# Patient Record
Sex: MALE | Race: OTHER | HISPANIC OR LATINO | URBAN - METROPOLITAN AREA
[De-identification: names, ages, dates, MRNs, and addresses within clinical notes are randomized per-mention and may not be internally consistent; named-entity substitution may affect disease eponyms.]

---

## 2022-10-19 ENCOUNTER — INPATIENT (INPATIENT)
Facility: HOSPITAL | Age: 42
LOS: 2 days | Discharge: HOME | End: 2022-10-22
Attending: STUDENT IN AN ORGANIZED HEALTH CARE EDUCATION/TRAINING PROGRAM | Admitting: STUDENT IN AN ORGANIZED HEALTH CARE EDUCATION/TRAINING PROGRAM

## 2022-10-19 VITALS
OXYGEN SATURATION: 100 % | HEART RATE: 78 BPM | RESPIRATION RATE: 15 BRPM | SYSTOLIC BLOOD PRESSURE: 160 MMHG | TEMPERATURE: 98 F | DIASTOLIC BLOOD PRESSURE: 106 MMHG

## 2022-10-19 DIAGNOSIS — E11.9 TYPE 2 DIABETES MELLITUS WITHOUT COMPLICATIONS: ICD-10-CM

## 2022-10-19 DIAGNOSIS — E11.10 TYPE 2 DIABETES MELLITUS WITH KETOACIDOSIS WITHOUT COMA: ICD-10-CM

## 2022-10-19 DIAGNOSIS — Z98.52 VASECTOMY STATUS: Chronic | ICD-10-CM

## 2022-10-19 DIAGNOSIS — E78.1 PURE HYPERGLYCERIDEMIA: ICD-10-CM

## 2022-10-19 DIAGNOSIS — K85.90 ACUTE PANCREATITIS WITHOUT NECROSIS OR INFECTION, UNSPECIFIED: ICD-10-CM

## 2022-10-19 DIAGNOSIS — I10 ESSENTIAL (PRIMARY) HYPERTENSION: ICD-10-CM

## 2022-10-19 LAB
A1C WITH ESTIMATED AVERAGE GLUCOSE RESULT: 12 % — HIGH (ref 4–5.6)
ALBUMIN SERPL ELPH-MCNC: 2.7 G/DL — LOW (ref 3.3–5)
ALBUMIN SERPL ELPH-MCNC: 3.6 G/DL — SIGNIFICANT CHANGE UP (ref 3.3–5)
ALBUMIN SERPL ELPH-MCNC: 4 G/DL — SIGNIFICANT CHANGE UP (ref 3.3–5)
ALBUMIN SERPL ELPH-MCNC: 4.3 G/DL — SIGNIFICANT CHANGE UP (ref 3.3–5)
ALP SERPL-CCNC: 101 U/L — SIGNIFICANT CHANGE UP (ref 40–120)
ALP SERPL-CCNC: 60 U/L — SIGNIFICANT CHANGE UP (ref 40–120)
ALP SERPL-CCNC: 85 U/L — SIGNIFICANT CHANGE UP (ref 40–120)
ALP SERPL-CCNC: 86 U/L — SIGNIFICANT CHANGE UP (ref 40–120)
ALT FLD-CCNC: 24 U/L — SIGNIFICANT CHANGE UP (ref 4–41)
ALT FLD-CCNC: 25 U/L — SIGNIFICANT CHANGE UP (ref 4–41)
ALT FLD-CCNC: 26 U/L — SIGNIFICANT CHANGE UP (ref 4–41)
ALT FLD-CCNC: 39 U/L — SIGNIFICANT CHANGE UP (ref 4–41)
ANION GAP SERPL CALC-SCNC: 13 MMOL/L — SIGNIFICANT CHANGE UP (ref 7–14)
ANION GAP SERPL CALC-SCNC: 13 MMOL/L — SIGNIFICANT CHANGE UP (ref 7–14)
ANION GAP SERPL CALC-SCNC: 15 MMOL/L — HIGH (ref 7–14)
ANION GAP SERPL CALC-SCNC: 17 MMOL/L — HIGH (ref 7–14)
APPEARANCE UR: CLEAR — SIGNIFICANT CHANGE UP
APTT BLD: 27.5 SEC — SIGNIFICANT CHANGE UP (ref 27–36.3)
AST SERPL-CCNC: 17 U/L — SIGNIFICANT CHANGE UP (ref 4–40)
AST SERPL-CCNC: 29 U/L — SIGNIFICANT CHANGE UP (ref 4–40)
AST SERPL-CCNC: 34 U/L — SIGNIFICANT CHANGE UP (ref 4–40)
AST SERPL-CCNC: 43 U/L — HIGH (ref 4–40)
B-OH-BUTYR SERPL-SCNC: 0.4 MMOL/L — SIGNIFICANT CHANGE UP (ref 0–0.4)
B-OH-BUTYR SERPL-SCNC: 0.7 MMOL/L — HIGH (ref 0–0.4)
B-OH-BUTYR SERPL-SCNC: 3 MMOL/L — HIGH (ref 0–0.4)
B-OH-BUTYR SERPL-SCNC: 4 MMOL/L — HIGH (ref 0–0.4)
B-OH-BUTYR SERPL-SCNC: 4.4 MMOL/L — HIGH (ref 0–0.4)
BASE EXCESS BLDV CALC-SCNC: -1.7 MMOL/L — SIGNIFICANT CHANGE UP (ref -2–3)
BASE EXCESS BLDV CALC-SCNC: -2.9 MMOL/L — LOW (ref -2–3)
BASE EXCESS BLDV CALC-SCNC: -8 MMOL/L — LOW (ref -2–3)
BASOPHILS # BLD AUTO: 0.05 K/UL — SIGNIFICANT CHANGE UP (ref 0–0.2)
BASOPHILS NFR BLD AUTO: 0.3 % — SIGNIFICANT CHANGE UP (ref 0–2)
BILIRUB SERPL-MCNC: 0.3 MG/DL — SIGNIFICANT CHANGE UP (ref 0.2–1.2)
BILIRUB SERPL-MCNC: 0.5 MG/DL — SIGNIFICANT CHANGE UP (ref 0.2–1.2)
BILIRUB SERPL-MCNC: 0.5 MG/DL — SIGNIFICANT CHANGE UP (ref 0.2–1.2)
BILIRUB SERPL-MCNC: 0.6 MG/DL — SIGNIFICANT CHANGE UP (ref 0.2–1.2)
BILIRUB UR-MCNC: NEGATIVE — SIGNIFICANT CHANGE UP
BLOOD GAS VENOUS COMPREHENSIVE RESULT: SIGNIFICANT CHANGE UP
BUN SERPL-MCNC: 10 MG/DL — SIGNIFICANT CHANGE UP (ref 7–23)
BUN SERPL-MCNC: 12 MG/DL — SIGNIFICANT CHANGE UP (ref 7–23)
BUN SERPL-MCNC: 13 MG/DL — SIGNIFICANT CHANGE UP (ref 7–23)
BUN SERPL-MCNC: 2 MG/DL — LOW (ref 7–23)
BUN SERPL-MCNC: 6 MG/DL — LOW (ref 7–23)
BUN SERPL-MCNC: 6 MG/DL — LOW (ref 7–23)
CA-I BLD-SCNC: 1.07 MMOL/L — LOW (ref 1.15–1.29)
CA-I BLD-SCNC: 1.08 MMOL/L — LOW (ref 1.15–1.29)
CALCIUM SERPL-MCNC: 8 MG/DL — LOW (ref 8.4–10.5)
CALCIUM SERPL-MCNC: 8.9 MG/DL — SIGNIFICANT CHANGE UP (ref 8.4–10.5)
CALCIUM SERPL-MCNC: 8.9 MG/DL — SIGNIFICANT CHANGE UP (ref 8.4–10.5)
CALCIUM SERPL-MCNC: 9.4 MG/DL — SIGNIFICANT CHANGE UP (ref 8.4–10.5)
CALCIUM SERPL-MCNC: 9.5 MG/DL — SIGNIFICANT CHANGE UP (ref 8.4–10.5)
CALCIUM SERPL-MCNC: 9.7 MG/DL — SIGNIFICANT CHANGE UP (ref 8.4–10.5)
CHLORIDE BLDV-SCNC: 91 MMOL/L — LOW (ref 96–108)
CHLORIDE BLDV-SCNC: 99 MMOL/L — SIGNIFICANT CHANGE UP (ref 96–108)
CHLORIDE BLDV-SCNC: 99 MMOL/L — SIGNIFICANT CHANGE UP (ref 96–108)
CHLORIDE SERPL-SCNC: 101 MMOL/L — SIGNIFICANT CHANGE UP (ref 98–107)
CHLORIDE SERPL-SCNC: 88 MMOL/L — LOW (ref 98–107)
CHLORIDE SERPL-SCNC: 92 MMOL/L — LOW (ref 98–107)
CHLORIDE SERPL-SCNC: 97 MMOL/L — LOW (ref 98–107)
CHLORIDE SERPL-SCNC: 97 MMOL/L — LOW (ref 98–107)
CHLORIDE SERPL-SCNC: 98 MMOL/L — SIGNIFICANT CHANGE UP (ref 98–107)
CHOLEST SERPL-MCNC: 408 MG/DL — HIGH
CK SERPL-CCNC: 76 U/L — SIGNIFICANT CHANGE UP (ref 30–200)
CO2 BLDV-SCNC: 17.7 MMOL/L — LOW (ref 22–26)
CO2 BLDV-SCNC: 23.2 MMOL/L — SIGNIFICANT CHANGE UP (ref 22–26)
CO2 BLDV-SCNC: 24.2 MMOL/L — SIGNIFICANT CHANGE UP (ref 22–26)
CO2 SERPL-SCNC: 15 MMOL/L — LOW (ref 22–31)
CO2 SERPL-SCNC: 16 MMOL/L — LOW (ref 22–31)
CO2 SERPL-SCNC: 17 MMOL/L — LOW (ref 22–31)
CO2 SERPL-SCNC: 19 MMOL/L — LOW (ref 22–31)
CO2 SERPL-SCNC: 19 MMOL/L — LOW (ref 22–31)
CO2 SERPL-SCNC: 21 MMOL/L — LOW (ref 22–31)
COLOR SPEC: SIGNIFICANT CHANGE UP
CREAT SERPL-MCNC: 0.37 MG/DL — LOW (ref 0.5–1.3)
CREAT SERPL-MCNC: 0.5 MG/DL — SIGNIFICANT CHANGE UP (ref 0.5–1.3)
CREAT SERPL-MCNC: 0.52 MG/DL — SIGNIFICANT CHANGE UP (ref 0.5–1.3)
CREAT SERPL-MCNC: 0.54 MG/DL — SIGNIFICANT CHANGE UP (ref 0.5–1.3)
CREAT SERPL-MCNC: 0.61 MG/DL — SIGNIFICANT CHANGE UP (ref 0.5–1.3)
CREAT SERPL-MCNC: 0.65 MG/DL — SIGNIFICANT CHANGE UP (ref 0.5–1.3)
DIFF PNL FLD: NEGATIVE — SIGNIFICANT CHANGE UP
EGFR: 122 ML/MIN/1.73M2 — SIGNIFICANT CHANGE UP
EGFR: 125 ML/MIN/1.73M2 — SIGNIFICANT CHANGE UP
EGFR: 129 ML/MIN/1.73M2 — SIGNIFICANT CHANGE UP
EGFR: 131 ML/MIN/1.73M2 — SIGNIFICANT CHANGE UP
EGFR: 132 ML/MIN/1.73M2 — SIGNIFICANT CHANGE UP
EGFR: 145 ML/MIN/1.73M2 — SIGNIFICANT CHANGE UP
EOSINOPHIL # BLD AUTO: 0.02 K/UL — SIGNIFICANT CHANGE UP (ref 0–0.5)
EOSINOPHIL NFR BLD AUTO: 0.1 % — SIGNIFICANT CHANGE UP (ref 0–6)
ESTIMATED AVERAGE GLUCOSE: 298 — SIGNIFICANT CHANGE UP
GAS PNL BLDV: 122 MMOL/L — LOW (ref 136–145)
GAS PNL BLDV: 130 MMOL/L — LOW (ref 136–145)
GAS PNL BLDV: 131 MMOL/L — LOW (ref 136–145)
GAS PNL BLDV: SIGNIFICANT CHANGE UP
GLUCOSE BLDC GLUCOMTR-MCNC: 106 MG/DL — HIGH (ref 70–99)
GLUCOSE BLDC GLUCOMTR-MCNC: 120 MG/DL — HIGH (ref 70–99)
GLUCOSE BLDC GLUCOMTR-MCNC: 125 MG/DL — HIGH (ref 70–99)
GLUCOSE BLDC GLUCOMTR-MCNC: 128 MG/DL — HIGH (ref 70–99)
GLUCOSE BLDC GLUCOMTR-MCNC: 146 MG/DL — HIGH (ref 70–99)
GLUCOSE BLDC GLUCOMTR-MCNC: 153 MG/DL — HIGH (ref 70–99)
GLUCOSE BLDC GLUCOMTR-MCNC: 155 MG/DL — HIGH (ref 70–99)
GLUCOSE BLDC GLUCOMTR-MCNC: 156 MG/DL — HIGH (ref 70–99)
GLUCOSE BLDC GLUCOMTR-MCNC: 180 MG/DL — HIGH (ref 70–99)
GLUCOSE BLDC GLUCOMTR-MCNC: 191 MG/DL — HIGH (ref 70–99)
GLUCOSE BLDC GLUCOMTR-MCNC: 233 MG/DL — HIGH (ref 70–99)
GLUCOSE BLDV-MCNC: 121 MG/DL — HIGH (ref 70–99)
GLUCOSE BLDV-MCNC: 163 MG/DL — HIGH (ref 70–99)
GLUCOSE BLDV-MCNC: >685 MG/DL — CRITICAL HIGH (ref 70–99)
GLUCOSE SERPL-MCNC: 118 MG/DL — HIGH (ref 70–99)
GLUCOSE SERPL-MCNC: 145 MG/DL — HIGH (ref 70–99)
GLUCOSE SERPL-MCNC: 214 MG/DL — HIGH (ref 70–99)
GLUCOSE SERPL-MCNC: 293 MG/DL — HIGH (ref 70–99)
GLUCOSE SERPL-MCNC: 334 MG/DL — HIGH (ref 70–99)
GLUCOSE SERPL-MCNC: 899 MG/DL — CRITICAL HIGH (ref 70–99)
GLUCOSE UR QL: ABNORMAL
HCO3 BLDV-SCNC: 17 MMOL/L — LOW (ref 22–29)
HCO3 BLDV-SCNC: 22 MMOL/L — SIGNIFICANT CHANGE UP (ref 22–29)
HCO3 BLDV-SCNC: 23 MMOL/L — SIGNIFICANT CHANGE UP (ref 22–29)
HCT VFR BLD CALC: 43.3 % — SIGNIFICANT CHANGE UP (ref 39–50)
HCT VFR BLDA CALC: 35 % — LOW (ref 39–51)
HCT VFR BLDA CALC: 44 % — SIGNIFICANT CHANGE UP (ref 39–51)
HCT VFR BLDA CALC: 45 % — SIGNIFICANT CHANGE UP (ref 39–51)
HDLC SERPL-MCNC: 18 MG/DL — LOW
HGB BLD CALC-MCNC: 11.8 G/DL — LOW (ref 13–17)
HGB BLD CALC-MCNC: 14.6 G/DL — SIGNIFICANT CHANGE UP (ref 13–17)
HGB BLD CALC-MCNC: 15 G/DL — SIGNIFICANT CHANGE UP (ref 13–17)
HGB BLD-MCNC: 14.5 G/DL — SIGNIFICANT CHANGE UP (ref 13–17)
IANC: 13.27 K/UL — HIGH (ref 1.8–7.4)
IMM GRANULOCYTES NFR BLD AUTO: 0.3 % — SIGNIFICANT CHANGE UP (ref 0–0.9)
INR BLD: 1.12 RATIO — SIGNIFICANT CHANGE UP (ref 0.88–1.16)
KETONES UR-MCNC: ABNORMAL
LACTATE BLDV-MCNC: 1 MMOL/L — SIGNIFICANT CHANGE UP (ref 0.5–2)
LACTATE BLDV-MCNC: 1.2 MMOL/L — SIGNIFICANT CHANGE UP (ref 0.5–2)
LACTATE BLDV-MCNC: 2.1 MMOL/L — HIGH (ref 0.5–2)
LACTATE BLDV-MCNC: 4.9 MMOL/L — CRITICAL HIGH (ref 0.5–2)
LEUKOCYTE ESTERASE UR-ACNC: NEGATIVE — SIGNIFICANT CHANGE UP
LIDOCAIN IGE QN: 1410 U/L — HIGH (ref 7–60)
LIPID PNL WITH DIRECT LDL SERPL: SIGNIFICANT CHANGE UP MG/DL
LYMPHOCYTES # BLD AUTO: 1.62 K/UL — SIGNIFICANT CHANGE UP (ref 1–3.3)
LYMPHOCYTES # BLD AUTO: 10.3 % — LOW (ref 13–44)
MAGNESIUM SERPL-MCNC: 1.2 MG/DL — LOW (ref 1.6–2.6)
MAGNESIUM SERPL-MCNC: 1.6 MG/DL — SIGNIFICANT CHANGE UP (ref 1.6–2.6)
MAGNESIUM SERPL-MCNC: 2.3 MG/DL — SIGNIFICANT CHANGE UP (ref 1.6–2.6)
MCHC RBC-ENTMCNC: 28.2 PG — SIGNIFICANT CHANGE UP (ref 27–34)
MCHC RBC-ENTMCNC: 33.4 GM/DL — SIGNIFICANT CHANGE UP (ref 32–36)
MCV RBC AUTO: 84.2 FL — SIGNIFICANT CHANGE UP (ref 80–100)
MONOCYTES # BLD AUTO: 0.66 K/UL — SIGNIFICANT CHANGE UP (ref 0–0.9)
MONOCYTES NFR BLD AUTO: 4.2 % — SIGNIFICANT CHANGE UP (ref 2–14)
NEUTROPHILS # BLD AUTO: 13.27 K/UL — HIGH (ref 1.8–7.4)
NEUTROPHILS NFR BLD AUTO: 84.8 % — HIGH (ref 43–77)
NITRITE UR-MCNC: NEGATIVE — SIGNIFICANT CHANGE UP
NON HDL CHOLESTEROL: 390 MG/DL — HIGH
NRBC # BLD: 0 /100 WBCS — SIGNIFICANT CHANGE UP (ref 0–0)
NRBC # FLD: 0 K/UL — SIGNIFICANT CHANGE UP (ref 0–0)
PCO2 BLDV: 31 MMHG — LOW (ref 42–55)
PCO2 BLDV: 38 MMHG — LOW (ref 42–55)
PCO2 BLDV: 38 MMHG — LOW (ref 42–55)
PH BLDV: 7.34 — SIGNIFICANT CHANGE UP (ref 7.32–7.43)
PH BLDV: 7.37 — SIGNIFICANT CHANGE UP (ref 7.32–7.43)
PH BLDV: 7.39 — SIGNIFICANT CHANGE UP (ref 7.32–7.43)
PH UR: 5.5 — SIGNIFICANT CHANGE UP (ref 5–8)
PHOSPHATE SERPL-MCNC: 1.9 MG/DL — LOW (ref 2.5–4.5)
PHOSPHATE SERPL-MCNC: 2.9 MG/DL — SIGNIFICANT CHANGE UP (ref 2.5–4.5)
PHOSPHATE SERPL-MCNC: 3.7 MG/DL — SIGNIFICANT CHANGE UP (ref 2.5–4.5)
PLATELET # BLD AUTO: 251 K/UL — SIGNIFICANT CHANGE UP (ref 150–400)
PO2 BLDV: 44 MMHG — SIGNIFICANT CHANGE UP
PO2 BLDV: 47 MMHG — SIGNIFICANT CHANGE UP
PO2 BLDV: 57 MMHG — SIGNIFICANT CHANGE UP
POTASSIUM BLDV-SCNC: 3.4 MMOL/L — LOW (ref 3.5–5.1)
POTASSIUM BLDV-SCNC: 3.5 MMOL/L — SIGNIFICANT CHANGE UP (ref 3.5–5.1)
POTASSIUM BLDV-SCNC: 3.6 MMOL/L — SIGNIFICANT CHANGE UP (ref 3.5–5.1)
POTASSIUM SERPL-MCNC: 3.8 MMOL/L — SIGNIFICANT CHANGE UP (ref 3.5–5.3)
POTASSIUM SERPL-MCNC: 3.8 MMOL/L — SIGNIFICANT CHANGE UP (ref 3.5–5.3)
POTASSIUM SERPL-MCNC: 4 MMOL/L — SIGNIFICANT CHANGE UP (ref 3.5–5.3)
POTASSIUM SERPL-MCNC: 5.2 MMOL/L — SIGNIFICANT CHANGE UP (ref 3.5–5.3)
POTASSIUM SERPL-MCNC: SIGNIFICANT CHANGE UP MMOL/L (ref 3.5–5.3)
POTASSIUM SERPL-MCNC: SIGNIFICANT CHANGE UP MMOL/L (ref 3.5–5.3)
POTASSIUM SERPL-SCNC: 3.8 MMOL/L — SIGNIFICANT CHANGE UP (ref 3.5–5.3)
POTASSIUM SERPL-SCNC: 3.8 MMOL/L — SIGNIFICANT CHANGE UP (ref 3.5–5.3)
POTASSIUM SERPL-SCNC: 4 MMOL/L — SIGNIFICANT CHANGE UP (ref 3.5–5.3)
POTASSIUM SERPL-SCNC: 5.2 MMOL/L — SIGNIFICANT CHANGE UP (ref 3.5–5.3)
POTASSIUM SERPL-SCNC: SIGNIFICANT CHANGE UP MMOL/L (ref 3.5–5.3)
POTASSIUM SERPL-SCNC: SIGNIFICANT CHANGE UP MMOL/L (ref 3.5–5.3)
PROT SERPL-MCNC: 5.1 G/DL — LOW (ref 6–8.3)
PROT SERPL-MCNC: 6.5 G/DL — SIGNIFICANT CHANGE UP (ref 6–8.3)
PROT SERPL-MCNC: 7 G/DL — SIGNIFICANT CHANGE UP (ref 6–8.3)
PROT SERPL-MCNC: 7 G/DL — SIGNIFICANT CHANGE UP (ref 6–8.3)
PROT UR-MCNC: ABNORMAL
PROTHROM AB SERPL-ACNC: 13 SEC — SIGNIFICANT CHANGE UP (ref 10.5–13.4)
RBC # BLD: 5.14 M/UL — SIGNIFICANT CHANGE UP (ref 4.2–5.8)
RBC # FLD: 12.8 % — SIGNIFICANT CHANGE UP (ref 10.3–14.5)
SAO2 % BLDV: 77.5 % — SIGNIFICANT CHANGE UP
SAO2 % BLDV: 79.8 % — SIGNIFICANT CHANGE UP
SAO2 % BLDV: 89.1 % — SIGNIFICANT CHANGE UP
SARS-COV-2 RNA SPEC QL NAA+PROBE: SIGNIFICANT CHANGE UP
SODIUM SERPL-SCNC: 118 MMOL/L — CRITICAL LOW (ref 135–145)
SODIUM SERPL-SCNC: 128 MMOL/L — LOW (ref 135–145)
SODIUM SERPL-SCNC: 130 MMOL/L — LOW (ref 135–145)
SODIUM SERPL-SCNC: 131 MMOL/L — LOW (ref 135–145)
SODIUM SERPL-SCNC: 131 MMOL/L — LOW (ref 135–145)
SODIUM SERPL-SCNC: 134 MMOL/L — LOW (ref 135–145)
SP GR SPEC: 1.04 — SIGNIFICANT CHANGE UP (ref 1.01–1.05)
TRIGL SERPL-MCNC: 2025 MG/DL — HIGH
TRIGL SERPL-MCNC: 812 MG/DL — HIGH
UROBILINOGEN FLD QL: SIGNIFICANT CHANGE UP
WBC # BLD: 15.67 K/UL — HIGH (ref 3.8–10.5)
WBC # FLD AUTO: 15.67 K/UL — HIGH (ref 3.8–10.5)

## 2022-10-19 PROCEDURE — 99285 EMERGENCY DEPT VISIT HI MDM: CPT

## 2022-10-19 PROCEDURE — 99223 1ST HOSP IP/OBS HIGH 75: CPT

## 2022-10-19 PROCEDURE — 99291 CRITICAL CARE FIRST HOUR: CPT | Mod: GC

## 2022-10-19 PROCEDURE — 71046 X-RAY EXAM CHEST 2 VIEWS: CPT | Mod: 26

## 2022-10-19 PROCEDURE — 74177 CT ABD & PELVIS W/CONTRAST: CPT | Mod: 26

## 2022-10-19 RX ORDER — INSULIN HUMAN 100 [IU]/ML
6 INJECTION, SOLUTION SUBCUTANEOUS
Qty: 100 | Refills: 0 | Status: DISCONTINUED | OUTPATIENT
Start: 2022-10-19 | End: 2022-10-20

## 2022-10-19 RX ORDER — FENOFIBRATE,MICRONIZED 130 MG
145 CAPSULE ORAL DAILY
Refills: 0 | Status: DISCONTINUED | OUTPATIENT
Start: 2022-10-19 | End: 2022-10-22

## 2022-10-19 RX ORDER — GLUCAGON INJECTION, SOLUTION 0.5 MG/.1ML
1 INJECTION, SOLUTION SUBCUTANEOUS ONCE
Refills: 0 | Status: DISCONTINUED | OUTPATIENT
Start: 2022-10-19 | End: 2022-10-22

## 2022-10-19 RX ORDER — SODIUM CHLORIDE 9 MG/ML
1000 INJECTION, SOLUTION INTRAVENOUS
Refills: 0 | Status: DISCONTINUED | OUTPATIENT
Start: 2022-10-19 | End: 2022-10-22

## 2022-10-19 RX ORDER — ACETAMINOPHEN 500 MG
650 TABLET ORAL EVERY 6 HOURS
Refills: 0 | Status: DISCONTINUED | OUTPATIENT
Start: 2022-10-19 | End: 2022-10-22

## 2022-10-19 RX ORDER — ATORVASTATIN CALCIUM 80 MG/1
40 TABLET, FILM COATED ORAL ONCE
Refills: 0 | Status: COMPLETED | OUTPATIENT
Start: 2022-10-19 | End: 2022-10-19

## 2022-10-19 RX ORDER — MORPHINE SULFATE 50 MG/1
2 CAPSULE, EXTENDED RELEASE ORAL EVERY 4 HOURS
Refills: 0 | Status: DISCONTINUED | OUTPATIENT
Start: 2022-10-19 | End: 2022-10-22

## 2022-10-19 RX ORDER — MORPHINE SULFATE 50 MG/1
4 CAPSULE, EXTENDED RELEASE ORAL ONCE
Refills: 0 | Status: DISCONTINUED | OUTPATIENT
Start: 2022-10-19 | End: 2022-10-19

## 2022-10-19 RX ORDER — INSULIN HUMAN 100 [IU]/ML
5 INJECTION, SOLUTION SUBCUTANEOUS
Qty: 100 | Refills: 0 | Status: DISCONTINUED | OUTPATIENT
Start: 2022-10-19 | End: 2022-10-19

## 2022-10-19 RX ORDER — LISINOPRIL 2.5 MG/1
10 TABLET ORAL DAILY
Refills: 0 | Status: DISCONTINUED | OUTPATIENT
Start: 2022-10-19 | End: 2022-10-22

## 2022-10-19 RX ORDER — DEXTROSE 50 % IN WATER 50 %
25 SYRINGE (ML) INTRAVENOUS ONCE
Refills: 0 | Status: DISCONTINUED | OUTPATIENT
Start: 2022-10-19 | End: 2022-10-22

## 2022-10-19 RX ORDER — ATORVASTATIN CALCIUM 80 MG/1
40 TABLET, FILM COATED ORAL AT BEDTIME
Refills: 0 | Status: DISCONTINUED | OUTPATIENT
Start: 2022-10-20 | End: 2022-10-22

## 2022-10-19 RX ORDER — SODIUM CHLORIDE 9 MG/ML
2000 INJECTION INTRAMUSCULAR; INTRAVENOUS; SUBCUTANEOUS ONCE
Refills: 0 | Status: COMPLETED | OUTPATIENT
Start: 2022-10-19 | End: 2022-10-19

## 2022-10-19 RX ORDER — SODIUM CHLORIDE 9 MG/ML
1000 INJECTION, SOLUTION INTRAVENOUS
Refills: 0 | Status: DISCONTINUED | OUTPATIENT
Start: 2022-10-19 | End: 2022-10-19

## 2022-10-19 RX ORDER — CHLORHEXIDINE GLUCONATE 213 G/1000ML
1 SOLUTION TOPICAL
Refills: 0 | Status: DISCONTINUED | OUTPATIENT
Start: 2022-10-19 | End: 2022-10-22

## 2022-10-19 RX ORDER — OMEGA-3 ACID ETHYL ESTERS 1 G
4 CAPSULE ORAL DAILY
Refills: 0 | Status: DISCONTINUED | OUTPATIENT
Start: 2022-10-19 | End: 2022-10-22

## 2022-10-19 RX ORDER — ONDANSETRON 8 MG/1
4 TABLET, FILM COATED ORAL ONCE
Refills: 0 | Status: COMPLETED | OUTPATIENT
Start: 2022-10-19 | End: 2022-10-19

## 2022-10-19 RX ORDER — POTASSIUM CHLORIDE 20 MEQ
10 PACKET (EA) ORAL
Refills: 0 | Status: COMPLETED | OUTPATIENT
Start: 2022-10-19 | End: 2022-10-19

## 2022-10-19 RX ORDER — MORPHINE SULFATE 50 MG/1
4 CAPSULE, EXTENDED RELEASE ORAL EVERY 6 HOURS
Refills: 0 | Status: DISCONTINUED | OUTPATIENT
Start: 2022-10-19 | End: 2022-10-19

## 2022-10-19 RX ORDER — MORPHINE SULFATE 50 MG/1
2 CAPSULE, EXTENDED RELEASE ORAL EVERY 4 HOURS
Refills: 0 | Status: DISCONTINUED | OUTPATIENT
Start: 2022-10-19 | End: 2022-10-19

## 2022-10-19 RX ORDER — INSULIN GLARGINE 100 [IU]/ML
14 INJECTION, SOLUTION SUBCUTANEOUS ONCE
Refills: 0 | Status: DISCONTINUED | OUTPATIENT
Start: 2022-10-19 | End: 2022-10-19

## 2022-10-19 RX ORDER — SODIUM CHLORIDE 9 MG/ML
1000 INJECTION, SOLUTION INTRAVENOUS
Refills: 0 | Status: DISCONTINUED | OUTPATIENT
Start: 2022-10-19 | End: 2022-10-20

## 2022-10-19 RX ORDER — DEXTROSE 50 % IN WATER 50 %
12.5 SYRINGE (ML) INTRAVENOUS ONCE
Refills: 0 | Status: DISCONTINUED | OUTPATIENT
Start: 2022-10-19 | End: 2022-10-22

## 2022-10-19 RX ORDER — POTASSIUM PHOSPHATE, MONOBASIC POTASSIUM PHOSPHATE, DIBASIC 236; 224 MG/ML; MG/ML
15 INJECTION, SOLUTION INTRAVENOUS ONCE
Refills: 0 | Status: COMPLETED | OUTPATIENT
Start: 2022-10-19 | End: 2022-10-19

## 2022-10-19 RX ORDER — MORPHINE SULFATE 50 MG/1
2 CAPSULE, EXTENDED RELEASE ORAL EVERY 6 HOURS
Refills: 0 | Status: DISCONTINUED | OUTPATIENT
Start: 2022-10-19 | End: 2022-10-19

## 2022-10-19 RX ORDER — SODIUM CHLORIDE 9 MG/ML
1000 INJECTION INTRAMUSCULAR; INTRAVENOUS; SUBCUTANEOUS ONCE
Refills: 0 | Status: COMPLETED | OUTPATIENT
Start: 2022-10-19 | End: 2022-10-19

## 2022-10-19 RX ORDER — ENOXAPARIN SODIUM 100 MG/ML
40 INJECTION SUBCUTANEOUS EVERY 24 HOURS
Refills: 0 | Status: DISCONTINUED | OUTPATIENT
Start: 2022-10-19 | End: 2022-10-22

## 2022-10-19 RX ORDER — DEXTROSE 50 % IN WATER 50 %
15 SYRINGE (ML) INTRAVENOUS ONCE
Refills: 0 | Status: DISCONTINUED | OUTPATIENT
Start: 2022-10-19 | End: 2022-10-22

## 2022-10-19 RX ORDER — BNT162B2 ORIGINAL AND OMICRON BA.4/BA.5 .1125; .1125 MG/2.25ML; MG/2.25ML
0.3 INJECTION, SUSPENSION INTRAMUSCULAR ONCE
Refills: 0 | Status: DISCONTINUED | OUTPATIENT
Start: 2022-10-19 | End: 2022-10-22

## 2022-10-19 RX ORDER — INSULIN LISPRO 100/ML
5 VIAL (ML) SUBCUTANEOUS ONCE
Refills: 0 | Status: COMPLETED | OUTPATIENT
Start: 2022-10-19 | End: 2022-10-19

## 2022-10-19 RX ORDER — INFLUENZA VIRUS VACCINE 15; 15; 15; 15 UG/.5ML; UG/.5ML; UG/.5ML; UG/.5ML
0.5 SUSPENSION INTRAMUSCULAR ONCE
Refills: 0 | Status: DISCONTINUED | OUTPATIENT
Start: 2022-10-19 | End: 2022-10-22

## 2022-10-19 RX ORDER — CALCIUM GLUCONATE 100 MG/ML
2 VIAL (ML) INTRAVENOUS ONCE
Refills: 0 | Status: COMPLETED | OUTPATIENT
Start: 2022-10-19 | End: 2022-10-19

## 2022-10-19 RX ORDER — MAGNESIUM SULFATE 500 MG/ML
2 VIAL (ML) INJECTION ONCE
Refills: 0 | Status: COMPLETED | OUTPATIENT
Start: 2022-10-19 | End: 2022-10-19

## 2022-10-19 RX ORDER — INSULIN LISPRO 100/ML
VIAL (ML) SUBCUTANEOUS EVERY 4 HOURS
Refills: 0 | Status: DISCONTINUED | OUTPATIENT
Start: 2022-10-19 | End: 2022-10-19

## 2022-10-19 RX ORDER — ACETAMINOPHEN 500 MG
1000 TABLET ORAL ONCE
Refills: 0 | Status: COMPLETED | OUTPATIENT
Start: 2022-10-19 | End: 2022-10-19

## 2022-10-19 RX ADMIN — MORPHINE SULFATE 2 MILLIGRAM(S): 50 CAPSULE, EXTENDED RELEASE ORAL at 15:55

## 2022-10-19 RX ADMIN — SODIUM CHLORIDE 100 MILLILITER(S): 9 INJECTION, SOLUTION INTRAVENOUS at 13:10

## 2022-10-19 RX ADMIN — Medication 100 MILLIEQUIVALENT(S): at 22:14

## 2022-10-19 RX ADMIN — Medication 1000 MILLIGRAM(S): at 20:00

## 2022-10-19 RX ADMIN — ATORVASTATIN CALCIUM 40 MILLIGRAM(S): 80 TABLET, FILM COATED ORAL at 14:57

## 2022-10-19 RX ADMIN — Medication 100 GRAM(S): at 14:55

## 2022-10-19 RX ADMIN — Medication 400 MILLIGRAM(S): at 19:47

## 2022-10-19 RX ADMIN — LISINOPRIL 10 MILLIGRAM(S): 2.5 TABLET ORAL at 14:55

## 2022-10-19 RX ADMIN — SODIUM CHLORIDE 40 MILLILITER(S): 9 INJECTION, SOLUTION INTRAVENOUS at 17:21

## 2022-10-19 RX ADMIN — INSULIN HUMAN 5 UNIT(S)/HR: 100 INJECTION, SOLUTION SUBCUTANEOUS at 19:19

## 2022-10-19 RX ADMIN — Medication 25 GRAM(S): at 21:00

## 2022-10-19 RX ADMIN — Medication 5 UNIT(S): at 10:14

## 2022-10-19 RX ADMIN — SODIUM CHLORIDE 2000 MILLILITER(S): 9 INJECTION INTRAMUSCULAR; INTRAVENOUS; SUBCUTANEOUS at 08:42

## 2022-10-19 RX ADMIN — Medication 100 MILLIEQUIVALENT(S): at 21:40

## 2022-10-19 RX ADMIN — INSULIN HUMAN 6 UNIT(S)/HR: 100 INJECTION, SOLUTION SUBCUTANEOUS at 23:31

## 2022-10-19 RX ADMIN — SODIUM CHLORIDE 40 MILLILITER(S): 9 INJECTION, SOLUTION INTRAVENOUS at 19:20

## 2022-10-19 RX ADMIN — SODIUM CHLORIDE 75 MILLILITER(S): 9 INJECTION, SOLUTION INTRAVENOUS at 19:19

## 2022-10-19 RX ADMIN — SODIUM CHLORIDE 1000 MILLILITER(S): 9 INJECTION INTRAMUSCULAR; INTRAVENOUS; SUBCUTANEOUS at 11:34

## 2022-10-19 RX ADMIN — MORPHINE SULFATE 4 MILLIGRAM(S): 50 CAPSULE, EXTENDED RELEASE ORAL at 11:19

## 2022-10-19 RX ADMIN — INSULIN HUMAN 5 UNIT(S)/HR: 100 INJECTION, SOLUTION SUBCUTANEOUS at 13:09

## 2022-10-19 RX ADMIN — POTASSIUM PHOSPHATE, MONOBASIC POTASSIUM PHOSPHATE, DIBASIC 62.5 MILLIMOLE(S): 236; 224 INJECTION, SOLUTION INTRAVENOUS at 22:56

## 2022-10-19 RX ADMIN — ENOXAPARIN SODIUM 40 MILLIGRAM(S): 100 INJECTION SUBCUTANEOUS at 17:19

## 2022-10-19 RX ADMIN — Medication 4 GRAM(S): at 14:56

## 2022-10-19 RX ADMIN — SODIUM CHLORIDE 2000 MILLILITER(S): 9 INJECTION INTRAMUSCULAR; INTRAVENOUS; SUBCUTANEOUS at 10:42

## 2022-10-19 RX ADMIN — Medication 145 MILLIGRAM(S): at 14:55

## 2022-10-19 RX ADMIN — SODIUM CHLORIDE 75 MILLILITER(S): 9 INJECTION, SOLUTION INTRAVENOUS at 17:20

## 2022-10-19 RX ADMIN — MORPHINE SULFATE 4 MILLIGRAM(S): 50 CAPSULE, EXTENDED RELEASE ORAL at 10:19

## 2022-10-19 RX ADMIN — ONDANSETRON 4 MILLIGRAM(S): 8 TABLET, FILM COATED ORAL at 08:42

## 2022-10-19 RX ADMIN — CHLORHEXIDINE GLUCONATE 1 APPLICATION(S): 213 SOLUTION TOPICAL at 14:56

## 2022-10-19 RX ADMIN — Medication 100 MILLIEQUIVALENT(S): at 23:00

## 2022-10-19 RX ADMIN — MORPHINE SULFATE 2 MILLIGRAM(S): 50 CAPSULE, EXTENDED RELEASE ORAL at 15:40

## 2022-10-19 NOTE — ED PROVIDER NOTE - CRITICAL CARE ATTENDING CONTRIBUTION TO CARE
I have evaluated the patient and agree with the documentation and assessment as made by the PA. We have discussed plan of care and work up for the patient.  I provided all elements of critical care.     Brief HPI:  42-year-old male past medical history of hypertension (diet-controlled) here complaining of epigastric abdominal pain since last night associated with vomiting.  Denies fever, chills, diarrhea, bloody or dark stool.  Denies etoh or illicit drug use history.     Vitals:   Reviewed    Exam:    GEN:  Non-toxic appearing, non-distressed, speaking full sentences, non-diaphoretic, AAOx3  HEENT:  NCAT, neck supple, EOMI, PERRLA, sclera anicteric, no conjunctival pallor or injection, no stridor, normal voice, no tonsillar exudate, uvula midline  CV:  regular rhythm and rate, s1/s2 audible, no murmurs, rubs or gallops, peripheral pulses 2+ and symmetric  PULM:  non-labored respirations, lungs clear to auscultation bilaterally, no wheezes, crackles or rales  ABD:  non distended, non-tender, no rebound, no guarding, negative Parikh's sign, bowel sounds normal, no cvat  MSK:  no gross deformity, non-tender extremities and joints, range of motion grossly normal appearing, no extremity edema, extremities warm and well perfused   NEURO:  AAOx3, CN II-XII intact, motor 5/5 in upper and lower extremities bilaterally, sensation grossly intact in extremities and trunk, finger to nose testing wnl, no nystagmus, negative Romberg, no pronator drift, no gait deficit  SKIN:  warm, dry, no rash or vesicles     A/P:  42-year-old male past medical history of hypertension (diet-controlled) here complaining of epigastric abdominal pain since last night.  VSS.  Abdomen non-tender.  Elevated fsg, no history of diabetes. Possible dka.  Will send labs, supportive care.  Dispo pending.

## 2022-10-19 NOTE — CONSULT NOTE ADULT - ASSESSMENT
42M with HTN presenting with abdominal pain, found to have HTG pancreatitis and DKA    #New onset DM (T1DM vs. KPD) with DKA with Hypertriglyceridemia induced pancreatitis   A1c 12%  pH 7.28, BHB 4.4, CO2 19, AG 17.   TG 2339  DKA and uncontrolled DM are both potential triggers for hyperTG induced pancreatitis. Improvement in DKA will improve TG levels     Recs:   -Patient without worrisome features of pancreatitis at this time, so continue NPO status, insulin gtt, and supportive measures with fluids.  -Trend TG q12 hrs and check BMP, BHB, VBG q4 hrs for now  -If FS < 200 while on insulin gtt, can start dexrose at lower rate (preferably 25-50 cc/hr to prevent worsening hypertriglyceridemia). Can consider using different IV line for these fluids so LR can run at higher rate on another line  -Insulin gtt should be continued until both DKA resolves (AG < 14, Bicarb > 18 on 2 repeat checks ideally) and TG are < 500 in setting of pancreatitis  -If DKA resolves, but TG are still >500, insulin gtt will need to be continued. In that case, the insulin gtt should continue at a lower rate (1-2 units/hr) than required for DKA to avoid high dextrose requirements which can worsen TG level and prolong ICU stay.   -If TG levels do not improve/worsen and/or patient has worrisome features concerning for organ dysfunction (hypocalcemia, fevers, leukocytosis, HD instability) will need to consider plasmapheresis as a possible treatment option  -Check Ab to r/o T1DM/RYAN - Glutamic Acid Decarboxylase, Zinc Transporter 8, Islet Cell Ab, and IA-2 Ab  -Would recommend initiation of Atorvastatin 40 mg daily when tolerating PO    For Discharge:   -Basal/bolus - final doses TBD  -Please d/c with supplies: insulin pens, pen needles, glucometer, test strips, lancets, and alcohol pads   -Endocrine Practice at [address] [place] [address], [address], [place] [address]; Ph # [phone]    #HLD  Start Atorvastatin 40 mg daily as above    #HTN  BP Goal < 130/80  Continue management per primary team    Natalia Chaney MD  Endocrine Fellow  Can be reached via teams.     For all urgent matters, can call answering service at [phone] (weekdays); [phone] (nights/weekends)  Any non-urgent consults or questions can be emailed to [email or web address] or [email or web address]     42M with HTN presenting with abdominal pain, found to have HTG pancreatitis and DKA    #New onset DM (T1DM vs. KPD) with DKA with Hypertriglyceridemia induced pancreatitis   A1c 12%  pH 7.28, BHB 4.4, CO2 19, AG 17.   TG 2339  DKA and uncontrolled DM are both potential triggers for hyperTG induced pancreatitis. Improvement in DKA will improve TG levels     Recs:   -Patient without worrisome features of pancreatitis at this time, so continue NPO status, insulin gtt, and supportive measures with fluids.  -Insulin gtt per ICU protocol. If FS < 200 while on insulin gtt, can start dexrose at lower rate (preferably 25-50 cc/hr to prevent worsening hypertriglyceridemia). Can consider using different IV line for these fluids so LR can run at higher rate on another line  -Trend TG q12 hrs and check BMP, BHB, VBG q4 hrs for now  -Insulin gtt should be continued until both DKA resolves (AG < 14, Bicarb > 18 on 2 repeat checks ideally) and TG are < 500 in setting of pancreatitis  -If DKA resolves, but TG are still >500, insulin gtt will need to be continued. In that case, the insulin gtt should continue at a lower rate (1-2 units/hr) than required for DKA to avoid high dextrose requirements which can worsen TG level and prolong ICU stay.   -If TG levels do not improve/worsen and/or patient has worrisome features concerning for organ dysfunction (hypocalcemia, fevers, leukocytosis, HD instability) will need to consider plasmapheresis as a possible treatment option  -Check Ab to r/o T1DM/RYAN - Glutamic Acid Decarboxylase, Zinc Transporter 8, Islet Cell Ab, and IA-2 Ab  -Would recommend initiation of Atorvastatin 40 mg daily when tolerating PO    For Discharge:   -Basal/bolus - final doses TBD  -Please d/c with supplies: insulin pens, pen needles, glucometer, test strips, lancets, and alcohol pads   -Endocrine Practice at [address] [place] [address], [address], [place] [address]; Ph # [phone]    #HLD  Start Atorvastatin 40 mg daily as above    #HTN  BP Goal < 130/80  Continue management per primary team    Natalia Chaney MD  Endocrine Fellow  Can be reached via teams.     For all urgent matters, can call answering service at [phone] (weekdays); [phone] (nights/weekends)  Any non-urgent consults or questions can be emailed to [email or web address] or [email or web address]     42M with HTN presenting with abdominal pain, found to have HTG pancreatitis and DKA    #New onset DM (T1DM vs. KPD) with DKA with Hypertriglyceridemia induced pancreatitis   A1c 12%  pH 7.28, BHB 4.4, CO2 19, AG 17.   TG 2339  DKA and uncontrolled DM are both potential triggers for hyperTG induced pancreatitis. Improvement in DKA will improve TG levels     Recs:   -Patient without worrisome features of pancreatitis at this time, so continue NPO status, insulin gtt, and supportive measures with fluids.  -Insulin gtt per ICU protocol. If FS < 200 while on insulin gtt, can start dexrose at lower rate (preferably 25-50 cc/hr to prevent worsening hypertriglyceridemia). Can consider using different IV line for these fluids so LR can run at higher rate on another line  -Thr prior is correcting the DKA with insulin gtt according to ICU DKA protocol. DKA resolution is defined as AG < 14 and bicarb > 18 on 2 consecutive BMP checks   -Trend TG q12 hrs and check BMP, BHB, VBG q4 hrs for now  -If DKA resolves, but TG are still >500, insulin gtt will need to be continued. In that case, the insulin gtt should continue at a lower rate (1-2 units/hr) than required for DKA to avoid high dextrose requirements which can worsen TG level and prolong ICU stay.   -If TG levels do not improve/worsen and/or patient has worrisome features concerning for organ dysfunction (hypocalcemia, fevers, leukocytosis, HD instability) will need to consider plasmapheresis as a possible treatment option  -Check Ab to r/o T1DM/RYAN - Glutamic Acid Decarboxylase, Zinc Transporter 8, Islet Cell Ab, and IA-2 Ab  -Would recommend initiation of Atorvastatin 40 mg daily when tolerating PO    For Discharge:   -Basal/bolus - final doses TBD  -Please d/c with supplies: insulin pens, pen needles, glucometer, test strips, lancets, and alcohol pads   -Endocrine Practice at [address] [place] [address], [address], [place] [address]; Ph # [phone]    #HLD  Start Atorvastatin 40 mg daily as above    #HTN  BP Goal < 130/80  Continue management per primary team    Natalia Chaney MD  Endocrine Fellow  Can be reached via teams.     For all urgent matters, can call answering service at [phone] (weekdays); [phone] (nights/weekends)  Any non-urgent consults or questions can be emailed to [email or web address] or [email or web address]     42M with HTN presenting with abdominal pain, found to have HTG pancreatitis and DKA    #New onset DM (T1DM vs. KPD) with DKA with Hypertriglyceridemia induced pancreatitis   A1c 12%  pH 7.28, BHB 4.4, CO2 19, AG 17.   TG 2339  DKA and uncontrolled DM are both potential triggers for hyperTG induced pancreatitis. Improvement in DKA will improve TG levels     Recs:   -Patient without worrisome features of pancreatitis at this time, so continue NPO status, insulin gtt, and supportive measures with fluids.  -Insulin gtt per ICU protocol. If FS < 200 while on insulin gtt, can start dexrose at lower rate (preferably 25-50 cc/hr to prevent worsening hypertriglyceridemia). Can consider using different IV line for these fluids so LR can run at higher rate on another line  -Thr prior is correcting the DKA with insulin gtt according to ICU DKA protocol. DKA resolution is defined as AG < 14 and bicarb > 18 on 2 consecutive BMP checks   -Trend TG q12 hrs and check BMP, BHB, VBG q4 hrs for now  -If DKA resolves, but TG are still >500, insulin gtt will need to be continued. In that case, the insulin gtt should continue at a lower rate (1-2 units/hr) than required for DKA to avoid high dextrose requirements which can worsen TG level and prolong ICU stay.   -If TG levels do not improve/worsen and/or patient has worrisome features concerning for organ dysfunction (hypocalcemia, fevers, leukocytosis, HD instability) will need to consider plasmapheresis as a possible treatment option  -Check Ab to r/o T1DM/RYAN - Glutamic Acid Decarboxylase, Zinc Transporter 8, Islet Cell Ab, and IA-2 Ab  -Would recommend initiation of Atorvastatin 40 mg daily, Fenofibrate 145 mg daily, and Lovaza 2g BID when tolerating PO    For Discharge:   -Basal/bolus - final doses TBD  -Please d/c with supplies: insulin pens, pen needles, glucometer, test strips, lancets, and alcohol pads   -Endocrine Practice at [address] [place] [address], [address], [place] [address]; Ph # [phone]    #HLD  Start Atorvastatin 40 mg daily as above    #HTN  BP Goal < 130/80  Continue management per primary team    Natalia Chaney MD  Endocrine Fellow  Can be reached via teams.     For all urgent matters, can call answering service at [phone] (weekdays); [phone] (nights/weekends)  Any non-urgent consults or questions can be emailed to [email or web address] or [email or web address]     42M with HTN presenting with abdominal pain, found to have HTG pancreatitis and DKA    #New onset DM (T1DM vs. KPD) with DKA with Hypertriglyceridemia induced pancreatitis   A1c 12%  pH 7.28, BHB 4.4, CO2 19, AG 17.   TG 2339  DKA and uncontrolled DM are both potential triggers for hyperTG induced pancreatitis. Improvement in DKA will improve TG levels     Recs:   -Patient with concern for pancreatitis at this time and hypocalcemia, so continue NPO status, insulin gtt, and supportive measures with fluids.  -if he was to clinically worsen or pancreatitis was to worsen would need to consider plasmapheresis currently appears well, has pain but does not appear toxic     -Insulin gtt per ICU protocol to treat DKA -   -Thr prior is correcting the DKA with insulin gtt according to ICU DKA protocol. DKA resolution is defined as AG < 14 and bicarb > 18 on 2 consecutive BMP checks   -Trend TG q12 hrs and check BMP, BHB, VBG q4 hrs for now  -If DKA resolves, but TG are still >500, insulin gtt will need to be continued. In that case, the insulin gtt should continue at a lower rate (1-2 units/hr) than required for DKA to avoid high dextrose requirements which can potentially worsen TG level  - once DKA resolves, and If FS < 200 while on insulin gtt, can start dexrose at lower rate (preferably 25-50 cc/hr to prevent worsening hypertriglyceridemia). Can consider using different IV line for these fluids so LR can run at higher rate on another line  -If TG levels do not improve/worsen and/or patient has worrisome features concerning for organ dysfunction (worsening hypocalcemia, fevers, leukocytosis, HD instability) will need to consider plasmapheresis as a possible treatment option  -Check Ab to r/o T1DM/RYAN - Glutamic Acid Decarboxylase, Zinc Transporter 8, Islet Cell Ab, and IA-2 Ab  -check cpeptide once glucotoxicity resolves   -Would recommend initiation of Atorvastatin 40 mg daily, Fenofibrate 145 mg daily, and Lovaza 2g BID when tolerating PO    For Discharge:   - pt is new to insulin and will need insulin teaching   -Basal/bolus - final doses TBD  -Please d/c with supplies: insulin pens, pen needles, glucometer, test strips, lancets, and alcohol pads   -Endocrine Practice at [address] [place] [address], [address], [place] [address]; Ph # [phone]    #HLD  Start Atorvastatin 40 mg daily as above    #HTN  BP Goal < 130/80  Continue management per primary team    Natalia Chaney MD  Endocrine Fellow  Can be reached via teams.     For all urgent matters, can call answering service at [phone] (weekdays); [phone] (nights/weekends)  Any non-urgent consults or questions can be emailed to [email or web address] or [email or web address]

## 2022-10-19 NOTE — H&P ADULT - HISTORY OF PRESENT ILLNESS
42-year-old male w/past medical history of hypertension (diet-controlled) presenting with epigastric abdominal pain since last night, now worsening, associated with an episode of vomiting in the ED.  States he may have had bad tuna yesterday.  Also admits to polydipsia, polyuria, and dry mouth over the past several weeks.  Fingerstick elevated in triage, patient denies any history of diabetes.  Admits to an 18-20 pound weight loss over the past month.  No recent illness.  Denies dysuria or cough.    MICU consulted for DKA glucose control.    In the ED:   Vitals:   Labs:   Imaging:   Interventions:      42-year-old male w/past medical history of hypertension (diet-controlled) presenting with epigastric abdominal pain since last night, now worsening, associated with an episode of vomiting in the ED.  States he may have had bad tuna yesterday.  Also admits to polydipsia, polyuria, and dry mouth over the past several weeks.  Fingerstick elevated in triage, patient denies any history of diabetes.  Admits to an 18-20 pound weight loss over the past month.  No recent illness.  Denies dysuria or cough.    MICU consulted for glucose control.    In the ED:     Vitals: T 36.7C, HR 78, /106, RR 15, 100% O2 sat on RA    Labs:     FS glucose 348  WBC 15.67    Cholesterol, Serum: 408 mg/dL (10.19.22 @ 11:00)  Triglycerides, Serum: 2025 mg/dL (10.19.22 @ 11:00)  HDL Cholesterol, Serum: 18 mg/dL (10.19.22 @ 11:00)  Non HDL Cholesterol: 390    Urinalysis (10.19.22 @ 09:22)    pH Urine: 5.5    Glucose Qualitative, Urine: >= 1000 mg/dL    Blood, Urine: Negative    Color: Light Yellow    Urine Appearance: Clear    Bilirubin: Negative    Ketone - Urine: Large    Specific Gravity: 1.041    Protein, Urine: 30 mg/dL    Urobilinogen: <2 mg/dL    Nitrite: Negative    Leukocyte Esterase Concentration: Negative    Blood Gas Profile - Venous (10.19.22 @ 12:20)    pH, Venous: 7.24    pCO2, Venous: 49 mmHg    pO2, Venous: 32 mmHg    HCO3, Venous: 21 mmol/L    Base Excess, Venous: -6.7 mmol/L    Oxygen Saturation, Venous: 51.9 %    Total CO2, Venous: 22.5 mmol/L    AG 17    Beta Hydroxy-Butyrate: 4.0 mmol/L (10.19.22 @ 09:35)    Hgb A1c: 12.0%  Estimated Average Glucose: 298 (10.19.22 @ 08:00)    Lipase, Serum (10.19.22 @ 08:00)    Lipase, Serum: >3000 U/L    IMAGING:     CXR 10/19 demonstrating clear lungs    INTERVENTIONS:     Lispro 5U subQ  Morphine 4mg IVP x1  Zofran 4mg IVP x1  NS 1L bolus  LR 1L bolus   42-year-old male w/past medical history of hypertension (diet-controlled) presenting with epigastric abdominal pain since last night, now worsening, associated with an episode of vomiting in the ED.  States he may have had bad tuna yesterday.  Also admits to polydipsia, polyuria, and dry mouth over the past several weeks.  Fingerstick elevated in triage, patient denies any history of diabetes.  Admits to an 18-20 pound weight loss over the past month.  No recent illness.  Denies dysuria or cough.    MICU consulted for glucose control.    In the ED:     Vitals: T 36.7C, HR 78, /106, RR 15, 100% O2 sat on RA    Labs:     CAPILLARY BLOOD GLUCOSE    POCT Blood Glucose.: 216 mg/dL (19 Oct 2022 12:09)  POCT Blood Glucose.: 220 mg/dL (19 Oct 2022 11:34)  POCT Blood Glucose.: 278 mg/dL (19 Oct 2022 10:12)  POCT Blood Glucose.: 348 mg/dL (19 Oct 2022 07:33)      LABS:                             14.5   15.67 )-----------( 251      ( 19 Oct 2022 08:00 )             43.3         10-19    131<L>  |  97<L>  |  12  ----------------------------<  293<H>  TNP   |  17<L>  |  0.61    Ca    8.9      19 Oct 2022 09:35    TPro  6.5  /  Alb  4.3  /  TBili  0.5  /  DBili  x   /  AST  34  /  ALT  25  /  AlkPhos  101  10-19      Cholesterol, Serum: 408 mg/dL (10.19.22 @ 11:00)  Triglycerides, Serum: 2025 mg/dL (10.19.22 @ 11:00)  HDL Cholesterol, Serum: 18 mg/dL (10.19.22 @ 11:00)  Non HDL Cholesterol: 390    Urinalysis (10.19.22 @ 09:22)    pH Urine: 5.5    Glucose Qualitative, Urine: >= 1000 mg/dL    Blood, Urine: Negative    Color: Light Yellow    Urine Appearance: Clear    Bilirubin: Negative    Ketone - Urine: Large    Specific Gravity: 1.041    Protein, Urine: 30 mg/dL    Urobilinogen: <2 mg/dL    Nitrite: Negative    Leukocyte Esterase Concentration: Negative    Blood Gas Profile - Venous (10.19.22 @ 12:20)    pH, Venous: 7.24    pCO2, Venous: 49 mmHg    pO2, Venous: 32 mmHg    HCO3, Venous: 21 mmol/L    Base Excess, Venous: -6.7 mmol/L    Oxygen Saturation, Venous: 51.9 %    Total CO2, Venous: 22.5 mmol/L    AG 17    Beta Hydroxy-Butyrate: 4.0 mmol/L (10.19.22 @ 09:35)    Hgb A1c: 12.0%    Estimated Average Glucose: 298 (10.19.22 @ 08:00)    Lipase, Serum (10.19.22 @ 08:00)    Lipase, Serum: >3000 U/L    IMAGING:     CXR 10/19 demonstrating clear lungs    INTERVENTIONS:     Lispro 5U subQ  Morphine 4mg IVP x1  Zofran 4mg IVP x1  NS 1L bolus  LR 1L bolus   42-year-old male w/past medical history of hypertension (diet-controlled) presenting with epigastric abdominal pain since last night, now worsening, associated with an episode of vomiting in the ED.  States he may have had bad tuna yesterday.  Also admits to polydipsia, polyuria, and dry mouth over the past several weeks.  Fingerstick elevated in triage, patient denies any history of diabetes.  Admits to an 18-20 pound weight loss over the past month.  No recent illness.  Denies dysuria or cough.    MICU consulted for glucose control.    In the ED:     Vitals: T 36.7C, HR 78, /106, RR 15, 100% O2 sat on RA    Labs:     CAPILLARY BLOOD GLUCOSE    POCT Blood Glucose.: 216 mg/dL (19 Oct 2022 12:09)  POCT Blood Glucose.: 220 mg/dL (19 Oct 2022 11:34)  POCT Blood Glucose.: 278 mg/dL (19 Oct 2022 10:12)  POCT Blood Glucose.: 348 mg/dL (19 Oct 2022 07:33)      LABS:                             14.5   15.67 )-----------( 251      ( 19 Oct 2022 08:00 )             43.3         10-19    131<L>  |  97<L>  |  12  ----------------------------<  293<H>  TNP   |  17<L>  |  0.61    Ca    8.9      19 Oct 2022 09:35    TPro  6.5  /  Alb  4.3  /  TBili  0.5  /  DBili  x   /  AST  34  /  ALT  25  /  AlkPhos  101  10-19      Cholesterol, Serum: 408 mg/dL (10.19.22 @ 11:00)  Triglycerides, Serum: 2025 mg/dL (10.19.22 @ 11:00)  HDL Cholesterol, Serum: 18 mg/dL (10.19.22 @ 11:00)  Non HDL Cholesterol: 390    Urinalysis (10.19.22 @ 09:22)    pH Urine: 5.5    Glucose Qualitative, Urine: >= 1000 mg/dL    Blood, Urine: Negative    Color: Light Yellow    Urine Appearance: Clear    Bilirubin: Negative    Ketone - Urine: Large    Specific Gravity: 1.041    Protein, Urine: 30 mg/dL    Urobilinogen: <2 mg/dL    Nitrite: Negative    Leukocyte Esterase Concentration: Negative    Blood Gas Profile - Venous (10.19.22 @ 12:20)    pH, Venous: 7.24    pCO2, Venous: 49 mmHg    pO2, Venous: 32 mmHg    HCO3, Venous: 21 mmol/L    Base Excess, Venous: -6.7 mmol/L    Oxygen Saturation, Venous: 51.9 %    Total CO2, Venous: 22.5 mmol/L       Blood Gas Venous - Lactate: 1.7-2.3        AG 17    Beta Hydroxy-Butyrate: 4.0 mmol/L (10.19.22 @ 09:35)    Hgb A1c: 12.0%    Estimated Average Glucose: 298 (10.19.22 @ 08:00)    Lipase, Serum (10.19.22 @ 08:00)    Lipase, Serum: >3000 U/L    IMAGING:     CXR 10/19 demonstrating clear lungs    INTERVENTIONS:     Lispro 5U subQ  Morphine 4mg IVP x1  Zofran 4mg IVP x1  NS 1L bolus  LR 1L bolus   42-year-old male w/past medical history of hypertension (diet-controlled) presenting with epigastric abdominal pain since last night, now worsening, associated with an episode of vomiting in the ED.  States he may have had bad tuna yesterday.  Also admits to polydipsia, polyuria, and dry mouth over the past several weeks.  Fingerstick elevated in triage, patient denies any history of diabetes.  Admits to an 18-20 pound weight loss over the past month.  No recent illness.  Denies dysuria or cough.    MICU consulted for glucose control.    In the ED:     Vitals: T 36.7C, HR 78, /106, RR 15, 100% O2 sat on RA  Labs: + BHB 4.4 lactate 2.1, lipase > 3000, triglycerides 2339   Imaging: Pending CT and cxr clear lungs     INTERVENTIONS:   Lispro 5U subQ  Morphine 4mg IVP x1  Zofran 4mg IVP x1  NS 1L bolus  LR 1L bolus

## 2022-10-19 NOTE — ED PROVIDER NOTE - PHYSICAL EXAMINATION
Vital signs reviewed.   CONSTITUTIONAL: Well-appearing; well-nourished; in no apparent distress. Non-toxic appearing.   HEAD: Normocephalic, atraumatic.  EYES: PERRL, EOM intact, conjunctiva and sclera WNL.  ENT: dry mucous membranes   NECK/LYMPH: Supple; non-tender; no cervical lymphadenopathy.  CARD: Normal S1, S2; RRR  RESP: Normal chest excursion with respiration; breath sounds clear and equal bilaterally; no wheezes, rhonchi, or rales.  ABD/GI: soft, non-distended; LUQ tenderness, no guarding  EXT/MS: moves all extremities; distal pulses are normal, no pedal edema.  SKIN: Normal for age and race; warm; dry; good turgor; no apparent lesions or exudate noted.  NEURO: Awake, alert, oriented x 3, no gross deficits, CN II-XII grossly intact, no motor or sensory deficit noted.  PSYCH: Normal mood; appropriate affect.

## 2022-10-19 NOTE — H&P ADULT - NSHPREVIEWOFSYSTEMS_GEN_ALL_CORE
REVIEW OF SYSTEMS:  CONSTITUTIONAL: No weakness, fevers or chills  EYES/ENT: No visual changes;  No vertigo or throat pain   NECK: No pain or stiffness  RESPIRATORY: No cough, wheezing, hemoptysis; No shortness of breath  CARDIOVASCULAR: No chest pain or palpitations  GASTROINTESTINAL: + abdominal pain. + nausea, + vomiting, No diarrhea or constipation. No melena or hematochezia.  GENITOURINARY: No dysuria, frequency or hematuria  NEUROLOGICAL: No numbness or weakness  SKIN: No itching, burning, rashes, or lesions   All other review of systems is negative unless indicated above.

## 2022-10-19 NOTE — CHART NOTE - NSCHARTNOTEFT_GEN_A_CORE
HPI:    42-year-old male w/past medical history of hypertension (diet-controlled) presenting with epigastric abdominal pain since last night, now worsening, associated with an episode of vomiting in the ED.  States he may have had bad tuna yesterday.  Also admits to polydipsia, polyuria, and dry mouth over the past several weeks.  Fingerstick elevated in triage, patient denies any history of diabetes.  Admits to an 18-20 pound weight loss over the past month.  No recent illness.  Denies dysuria or cough.    MICU consulted for DKA glucose control.      PAST MEDICAL HISTORY:    HTN (diet-controlled)    PAST SURGICAL HISTORY:    VASECTOMY     Tobacco Usage:  · Tobacco Usage	Never smoker    ALLERGIES AND HOME MEDICATIONS:     Allergies:        Allergies:  	No Known Allergies:     Home Medications:   * Outpatient Medication Status not yet specified    REVIEW OF SYSTEMS:     · CONSTITUTIONAL: no fever and no chills.  · CARDIOVASCULAR: no chest pain and no edema.  · RESPIRATORY: no chest pain, no cough, and no shortness of breath.  · Gastrointestinal [+]: ABDOMINAL PAIN, NAUSEA, VOMITING  · Endocrine [+]: EXCESSIVE URINATION, EXCESSIVE THIRST, HYPERGLYCEMIA    VITALS:    ICU Vital Signs Last 24 Hrs  T(C): 36.7 (19 Oct 2022 08:54), Max: 36.7 (19 Oct 2022 07:29)  T(F): 98 (19 Oct 2022 08:54), Max: 98.1 (19 Oct 2022 07:29)  HR: 86 (19 Oct 2022 08:54) (78 - 86)  BP: 145/85 (19 Oct 2022 08:54) (145/85 - 160/106)  BP(mean): --  ABP: --  ABP(mean): --  RR: 16 (19 Oct 2022 08:54) (15 - 16)  SpO2: 100% (19 Oct 2022 08:54) (100% - 100%)    O2 Parameters below as of 19 Oct 2022 08:54  Patient On (Oxygen Delivery Method): room air    PHYSICAL EXAM:   · Physical Examination: Vital signs reviewed.   	CONSTITUTIONAL: Well-appearing; well-nourished; in no apparent distress. Non-toxic appearing.   	HEAD: Normocephalic, atraumatic.  	EYES: PERRL, EOM intact, conjunctiva and sclera WNL.  	ENT: dry mucous membranes   	NECK/LYMPH: Supple; non-tender; no cervical lymphadenopathy.  	CARD: Normal S1, S2; RRR  	RESP: Normal chest excursion with respiration; breath sounds clear and equal bilaterally; no wheezes, rhonchi, or rales.  	ABD/GI: soft, non-distended; LUQ tenderness, no guarding  	EXT/MS: moves all extremities; distal pulses are normal, no pedal edema.  	SKIN: Normal for age and race; warm; dry; good turgor; no apparent lesions or exudate noted.  	NEURO: Awake, alert, oriented x 3, no gross deficits, CN II-XII grossly intact, no motor or sensory deficit noted.                PSYCH: Normal mood; appropriate affect.      CAPILLARY BLOOD GLUCOSE    POCT Blood Glucose.: 216 mg/dL (19 Oct 2022 12:09)  POCT Blood Glucose.: 220 mg/dL (19 Oct 2022 11:34)  POCT Blood Glucose.: 278 mg/dL (19 Oct 2022 10:12)  POCT Blood Glucose.: 348 mg/dL (19 Oct 2022 07:33)      LABS:                           14.5   15.67 )-----------( 251      ( 19 Oct 2022 08:00 )             43.3         10-19    131<L>  |  97<L>  |  12  ----------------------------<  293<H>  TNP   |  17<L>  |  0.61    Ca    8.9      19 Oct 2022 09:35    TPro  6.5  /  Alb  4.3  /  TBili  0.5  /  DBili  x   /  AST  34  /  ALT  25  /  AlkPhos  101  10-19      Cholesterol, Serum: 408 mg/dL (10.19.22 @ 11:00)  Triglycerides, Serum: 2025 mg/dL (10.19.22 @ 11:00)  HDL Cholesterol, Serum: 18 mg/dL (10.19.22 @ 11:00)  Non HDL Cholesterol: 390    Urinalysis (10.19.22 @ 09:22)    pH Urine: 5.5    Glucose Qualitative, Urine: >= 1000 mg/dL    Blood, Urine: Negative    Color: Light Yellow    Urine Appearance: Clear    Bilirubin: Negative    Ketone - Urine: Large    Specific Gravity: 1.041    Protein, Urine: 30 mg/dL    Urobilinogen: <2 mg/dL    Nitrite: Negative    Leukocyte Esterase Concentration: Negative    Blood Gas Profile - Venous (10.19.22 @ 12:20)    pH, Venous: 7.24    pCO2, Venous: 49 mmHg    pO2, Venous: 32 mmHg    HCO3, Venous: 21 mmol/L    Base Excess, Venous: -6.7 mmol/L    Oxygen Saturation, Venous: 51.9 %    Total CO2, Venous: 22.5 mmol/L    AG 17    Beta Hydroxy-Butyrate: 4.0 mmol/L (10.19.22 @ 09:35)    Hgb A1c: 12.0%  Estimated Average Glucose: 298 (10.19.22 @ 08:00)    Lipase, Serum (10.19.22 @ 08:00)    Lipase, Serum: >3000 U/L    RADIOLOGY:      ACC: [id number] EXAM:  XR CHEST PA LAT 2V                          PROCEDURE DATE:  10/19/2022      INTERPRETATION:    INDICATION: hyperglycemia, evaluate for infectious   etiology    COMPARISON: None available.    FINDINGS:  Lines/Devices: None.  Heart/Vascular: The heart size is normal.  Pulmonary: The lungs are clear.  No pleural effusion or pneumothorax.  Bones: No acute findings.    Impression:  Clear lungs.    ASSESSMENT AND PLAN    42-year-old male w/past medical history of hypertension (diet-controlled) presenting with worsening epigastric abdominal pain associated with nausea and vomiting in the context of several weeks of polydipsia, polyuria, and dry mouth, and 18-20 lbs weight loss over the past month. Found to have elevated blood glucose, acidosis with high anion gap, elevated Hgb A1c and beta hydroxy-butyrate, and elevated lipase and TG. Plan to admit to MICU for glucose management.    PLAN:      Insulin gtt HPI:    42-year-old male w/past medical history of hypertension (diet-controlled) presenting with epigastric abdominal pain since last night, now worsening, associated with an episode of vomiting in the ED.  States he may have had bad tuna yesterday.  Also admits to polydipsia, polyuria, and dry mouth over the past several weeks.  Fingerstick elevated in triage, patient denies any history of diabetes.  Admits to an 18-20 pound weight loss over the past month.  No recent illness.  Denies dysuria or cough.    MICU consulted for DKA glucose control.      PAST MEDICAL HISTORY:    HTN (diet-controlled)    PAST SURGICAL HISTORY:    VASECTOMY     Tobacco Usage:  · Tobacco Usage	Never smoker    ALLERGIES AND HOME MEDICATIONS:     Allergies:        Allergies:  	No Known Allergies:     Home Medications:   * Outpatient Medication Status not yet specified    REVIEW OF SYSTEMS:     · CONSTITUTIONAL: no fever and no chills.  · CARDIOVASCULAR: no chest pain and no edema.  · RESPIRATORY: no chest pain, no cough, and no shortness of breath.  · Gastrointestinal [+]: ABDOMINAL PAIN, NAUSEA, VOMITING  · Endocrine [+]: EXCESSIVE URINATION, EXCESSIVE THIRST, HYPERGLYCEMIA    VITALS:    ICU Vital Signs Last 24 Hrs  T(C): 36.7 (19 Oct 2022 08:54), Max: 36.7 (19 Oct 2022 07:29)  T(F): 98 (19 Oct 2022 08:54), Max: 98.1 (19 Oct 2022 07:29)  HR: 86 (19 Oct 2022 08:54) (78 - 86)  BP: 145/85 (19 Oct 2022 08:54) (145/85 - 160/106)  BP(mean): --  ABP: --  ABP(mean): --  RR: 16 (19 Oct 2022 08:54) (15 - 16)  SpO2: 100% (19 Oct 2022 08:54) (100% - 100%)    O2 Parameters below as of 19 Oct 2022 08:54  Patient On (Oxygen Delivery Method): room air    PHYSICAL EXAM:   · Physical Examination: Vital signs reviewed.   	CONSTITUTIONAL: Well-appearing; well-nourished; in no apparent distress. Non-toxic appearing.   	HEAD: Normocephalic, atraumatic.  	EYES: PERRL, EOM intact, conjunctiva and sclera WNL.  	ENT: dry mucous membranes   	NECK/LYMPH: Supple; non-tender; no cervical lymphadenopathy.  	CARD: Normal S1, S2; RRR  	RESP: Normal chest excursion with respiration; breath sounds clear and equal bilaterally; no wheezes, rhonchi, or rales.  	ABD/GI: soft, non-distended; LUQ tenderness, no guarding  	EXT/MS: moves all extremities; distal pulses are normal, no pedal edema.  	SKIN: Normal for age and race; warm; dry; good turgor; no apparent lesions or exudate noted.  	NEURO: Awake, alert, oriented x 3, no gross deficits, CN II-XII grossly intact, no motor or sensory deficit noted.                PSYCH: Normal mood; appropriate affect.      CAPILLARY BLOOD GLUCOSE    POCT Blood Glucose.: 216 mg/dL (19 Oct 2022 12:09)  POCT Blood Glucose.: 220 mg/dL (19 Oct 2022 11:34)  POCT Blood Glucose.: 278 mg/dL (19 Oct 2022 10:12)  POCT Blood Glucose.: 348 mg/dL (19 Oct 2022 07:33)      LABS:                           14.5   15.67 )-----------( 251      ( 19 Oct 2022 08:00 )             43.3         10-19    131<L>  |  97<L>  |  12  ----------------------------<  293<H>  TNP   |  17<L>  |  0.61    Ca    8.9      19 Oct 2022 09:35    TPro  6.5  /  Alb  4.3  /  TBili  0.5  /  DBili  x   /  AST  34  /  ALT  25  /  AlkPhos  101  10-19      Cholesterol, Serum: 408 mg/dL (10.19.22 @ 11:00)  Triglycerides, Serum: 2025 mg/dL (10.19.22 @ 11:00)  HDL Cholesterol, Serum: 18 mg/dL (10.19.22 @ 11:00)  Non HDL Cholesterol: 390    Urinalysis (10.19.22 @ 09:22)    pH Urine: 5.5    Glucose Qualitative, Urine: >= 1000 mg/dL    Blood, Urine: Negative    Color: Light Yellow    Urine Appearance: Clear    Bilirubin: Negative    Ketone - Urine: Large    Specific Gravity: 1.041    Protein, Urine: 30 mg/dL    Urobilinogen: <2 mg/dL    Nitrite: Negative    Leukocyte Esterase Concentration: Negative    Blood Gas Profile - Venous (10.19.22 @ 12:20)    pH, Venous: 7.24    pCO2, Venous: 49 mmHg    pO2, Venous: 32 mmHg    HCO3, Venous: 21 mmol/L    Base Excess, Venous: -6.7 mmol/L    Oxygen Saturation, Venous: 51.9 %    Total CO2, Venous: 22.5 mmol/L    AG 17    Beta Hydroxy-Butyrate: 4.0 mmol/L (10.19.22 @ 09:35)    Hgb A1c: 12.0%  Estimated Average Glucose: 298 (10.19.22 @ 08:00)    Lipase, Serum (10.19.22 @ 08:00)    Lipase, Serum: >3000 U/L    RADIOLOGY:      ACC: [id number] EXAM:  XR CHEST PA LAT 2V                          PROCEDURE DATE:  10/19/2022      INTERPRETATION:    INDICATION: hyperglycemia, evaluate for infectious   etiology    COMPARISON: None available.    FINDINGS:  Lines/Devices: None.  Heart/Vascular: The heart size is normal.  Pulmonary: The lungs are clear.  No pleural effusion or pneumothorax.  Bones: No acute findings.    Impression:  Clear lungs.    ASSESSMENT AND PLAN    42-year-old male w/past medical history of hypertension (diet-controlled) presenting with worsening epigastric abdominal pain associated with nausea and vomiting in the context of several weeks of polydipsia, polyuria, and dry mouth, and 18-20 lbs weight loss over the past month. Found to have elevated blood glucose, acidosis with high anion gap, elevated Hgb A1c and beta hydroxy-butyrate, and elevated lipase and TG c/w hypertriglyceridemia-induced acute pancreatitis 2/2 DKA. Plan to admit to MICU for management.    PLAN:    NEURO    - A/Ox4 at baseline  - Neuro checks q4  - Pain: s/p 4mg morphine IVPx1 in ED    CARDIOVASCULAR    - Patient endorses long-standing history of hypertension, managed with diet only  - No active issues, BP and HR stable    PULMONARY    - No active issues    GASTROINTESTINAL  - NPO  - D5LR 100cc/hr    ENDOCRINE  #DKA  - Insulin gtt    #Hypertriglyceridemia  - Lovaza  - Fenofibrate    INFECTIOUS DISEASE  - Afebrile, denies s/s of infection, UA and CXR w/o evidence of infection  - No active issues HPI:    42-year-old male w/past medical history of hypertension (diet-controlled) presenting with epigastric abdominal pain since last night, now worsening, associated with an episode of vomiting in the ED.  States he may have had bad tuna yesterday.  Also admits to polydipsia, polyuria, and dry mouth over the past several weeks.  Fingerstick elevated in triage, patient denies any history of diabetes.  Admits to an 18-20 pound weight loss over the past month.  No recent illness.  Denies dysuria or cough.    MICU consulted for DKA glucose control.      PAST MEDICAL HISTORY:    HTN (diet-controlled)    PAST SURGICAL HISTORY:    VASECTOMY     Tobacco Usage:  · Tobacco Usage	Never smoker    ALLERGIES AND HOME MEDICATIONS:     Allergies:        Allergies:  	No Known Allergies:     Home Medications:   * Outpatient Medication Status not yet specified    REVIEW OF SYSTEMS:     · CONSTITUTIONAL: no fever and no chills.  · CARDIOVASCULAR: no chest pain and no edema.  · RESPIRATORY: no chest pain, no cough, and no shortness of breath.  · Gastrointestinal [+]: ABDOMINAL PAIN, NAUSEA, VOMITING  · Endocrine [+]: EXCESSIVE URINATION, EXCESSIVE THIRST, HYPERGLYCEMIA    VITALS:    ICU Vital Signs Last 24 Hrs  T(C): 36.7 (19 Oct 2022 08:54), Max: 36.7 (19 Oct 2022 07:29)  T(F): 98 (19 Oct 2022 08:54), Max: 98.1 (19 Oct 2022 07:29)  HR: 86 (19 Oct 2022 08:54) (78 - 86)  BP: 145/85 (19 Oct 2022 08:54) (145/85 - 160/106)  BP(mean): --  ABP: --  ABP(mean): --  RR: 16 (19 Oct 2022 08:54) (15 - 16)  SpO2: 100% (19 Oct 2022 08:54) (100% - 100%)    O2 Parameters below as of 19 Oct 2022 08:54  Patient On (Oxygen Delivery Method): room air    PHYSICAL EXAM:   · Physical Examination: Vital signs reviewed.   	CONSTITUTIONAL: Well-appearing; well-nourished; in no apparent distress. Non-toxic appearing.   	HEAD: Normocephalic, atraumatic.  	EYES: PERRL, EOM intact, conjunctiva and sclera WNL.  	ENT: dry mucous membranes   	NECK/LYMPH: Supple; non-tender; no cervical lymphadenopathy.  	CARD: Normal S1, S2; RRR  	RESP: Normal chest excursion with respiration; breath sounds clear and equal bilaterally; no wheezes, rhonchi, or rales.  	ABD/GI: soft, non-distended; LUQ tenderness, no guarding  	EXT/MS: moves all extremities; distal pulses are normal, no pedal edema.  	SKIN: Normal for age and race; warm; dry; good turgor; no apparent lesions or exudate noted.  	NEURO: Awake, alert, oriented x 3, no gross deficits, CN II-XII grossly intact, no motor or sensory deficit noted.                PSYCH: Normal mood; appropriate affect.      CAPILLARY BLOOD GLUCOSE    POCT Blood Glucose.: 216 mg/dL (19 Oct 2022 12:09)  POCT Blood Glucose.: 220 mg/dL (19 Oct 2022 11:34)  POCT Blood Glucose.: 278 mg/dL (19 Oct 2022 10:12)  POCT Blood Glucose.: 348 mg/dL (19 Oct 2022 07:33)      LABS:                           14.5   15.67 )-----------( 251      ( 19 Oct 2022 08:00 )             43.3         10-19    131<L>  |  97<L>  |  12  ----------------------------<  293<H>  TNP   |  17<L>  |  0.61    Ca    8.9      19 Oct 2022 09:35    TPro  6.5  /  Alb  4.3  /  TBili  0.5  /  DBili  x   /  AST  34  /  ALT  25  /  AlkPhos  101  10-19      Cholesterol, Serum: 408 mg/dL (10.19.22 @ 11:00)  Triglycerides, Serum: 2025 mg/dL (10.19.22 @ 11:00)  HDL Cholesterol, Serum: 18 mg/dL (10.19.22 @ 11:00)  Non HDL Cholesterol: 390    Urinalysis (10.19.22 @ 09:22)    pH Urine: 5.5    Glucose Qualitative, Urine: >= 1000 mg/dL    Blood, Urine: Negative    Color: Light Yellow    Urine Appearance: Clear    Bilirubin: Negative    Ketone - Urine: Large    Specific Gravity: 1.041    Protein, Urine: 30 mg/dL    Urobilinogen: <2 mg/dL    Nitrite: Negative    Leukocyte Esterase Concentration: Negative    Blood Gas Profile - Venous (10.19.22 @ 12:20)    pH, Venous: 7.24    pCO2, Venous: 49 mmHg    pO2, Venous: 32 mmHg    HCO3, Venous: 21 mmol/L    Base Excess, Venous: -6.7 mmol/L    Oxygen Saturation, Venous: 51.9 %    Total CO2, Venous: 22.5 mmol/L    AG 17    Beta Hydroxy-Butyrate: 4.0 mmol/L (10.19.22 @ 09:35)    Hgb A1c: 12.0%  Estimated Average Glucose: 298 (10.19.22 @ 08:00)    Lipase, Serum (10.19.22 @ 08:00)    Lipase, Serum: >3000 U/L    RADIOLOGY:      ACC: [id number] EXAM:  XR CHEST PA LAT 2V                          PROCEDURE DATE:  10/19/2022      INTERPRETATION:    INDICATION: hyperglycemia, evaluate for infectious   etiology    COMPARISON: None available.    FINDINGS:  Lines/Devices: None.  Heart/Vascular: The heart size is normal.  Pulmonary: The lungs are clear.  No pleural effusion or pneumothorax.  Bones: No acute findings.    Impression:  Clear lungs.    ASSESSMENT AND PLAN    42-year-old male w/past medical history of hypertension (diet-controlled) presenting with worsening epigastric abdominal pain associated with nausea and vomiting in the context of several weeks of polydipsia, polyuria, and dry mouth, and 18-20 lbs weight loss over the past month. Found to have elevated blood glucose, acidosis with high anion gap, elevated Hgb A1c and beta hydroxy-butyrate, and elevated lipase and TG c/w hypertriglyceridemia-induced acute pancreatitis 2/2 DKA. Plan to admit to MICU for management.    PLAN:    NEURO    - A/Ox4 at baseline  - Neuro checks q4  - Pain: s/p 4mg morphine IVPx1 in ED    CARDIOVASCULAR    - Patient endorses long-standing history of hypertension, managed with diet only  - No active issues, BP and HR stable  - Vitals q1hr    PULMONARY    - No active issues    GASTROINTESTINAL    #Pancreatitis 2/2 hypertriglyceridemia in setting of DKA  - TG 2339  - Lipase >3000  > NPO  > D5LR 100cc/hr    ENDOCRINE    #DKA  - No known prior h/o diabetes  - A1c 12%  - pH 7.28, BHB 4.4, CO2 19, AG 17  - Appreciate endo recs, to f/u outpatient upon d/c  > Insulin gtt to be continued until both DKA resolves (AG < 14, bicarb >18 on 2 repeat checks) and TG are <500 in the setting of pancreaittis     - If FS <200 while on insulin gtt, can start dextrose at lower rate (25-50cc/hr to prevent worsening hypertriglyceridemia); consider using different IV line so LR can run at higher rate on the other line      -If DKA resolves, but TG are still >500, insulin gtt will need to be continued. In that case, the insulin gtt should continue at a lower rate (1-2 units/hr) than required for DKA to avoid high dextrose requirements which can worsen TG level and prolong ICU stay.        -If TG levels do not improve/worsen and/or patient has worrisome features concerning for organ dysfunction (hypocalcemia, fevers, leukocytosis, HD instability) will need to consider plasmapheresis as a possible treatment option  > FS q1hr  > Trend TG q12hr  > Check CMP, Mag/Phos, BHB, and VBG q4 hrs   > Check Ab to r/o T1DM/RYAN - Glutamic Acid Decarboxylase, Zinc Transporter 8, Islet Cell Ab, and IA-2 Ab    #Hypertriglyceridemia  - TG 2339  - Lovaza  - Fenofibrate  - Start atorvastatin 40mg daily when tolerating PO    #HLD  Start Atorvastatin 40 mg daily as above    RENAL    - Cr 0.65, BUN 13  - No active issues    INFECTIOUS DISEASE    - Afebrile, denies s/s of infection, UA and CXR w/o evidence of infection  - No active issues

## 2022-10-19 NOTE — ED PROVIDER NOTE - PROGRESS NOTE DETAILS
SANDRA Petersen: Spoke w. endocrine, recommend Lispro 5u Subq SANDRA Petersen: Triglycerides >2000, micu aware, will admit to their service. Pt aware and agrees w/ plan. SANDRA Petersen: Lipase >3000, micu c/s called, CT ordered

## 2022-10-19 NOTE — ED ADULT NURSE REASSESSMENT NOTE - NS ED NURSE REASSESS COMMENT FT1
Patient received in stretcher. AOX4. Respirations even and unlabored. Episode of vomiting noted -MD made aware. Medicated as per MD orders. Fluids infusing. Pending repeat FS. MD at bedside updating patient on plan of care. Comfort and safety maintained. Will continue to monitor Helder WEBSTER

## 2022-10-19 NOTE — H&P ADULT - ASSESSMENT
Patient is a 42-year-old male w/ pmh of HTN (diet-controlled) presenting w/ abdominal pain associated w/ N/V in the context of  polydipsia, polyuria and unintentional weight loss. Found to have mild DKA in the setting of acute hypertriglyceridemia-induced acute pancreatitis. Consideration for underlying malignancy or autoimmune pathology cannot be excluded.     PLAN:  NEURO  #Pain   - A/Ox4 at baseline  - Neuro checks q4  - Pain: s/p 4mg morphine IVPx1 in ED  ---- Mild pain: Acetaminophen 650mg PO   ---- Moderate pain: Morphine 2mg q6 hours IV   ---- Severe pain: Morphine 4mg q6 hours IV     CARDIOVASCULAR  #HTN   - Home meds: None   - Would start ACEi/ARB if diabetic     #ASCVD Risk:   - Patient endorses long-standing history of hypertension, managed with diet only  - No active issues, BP and HR stable  - Lipid Profile: Total Cholesterol 408, Triglycerides 2025, HDL 18 mg/dL, Non HDL cholesterol 390   - Lipoprotein A and Apoliprotein B       PULMONARY  - No active issues  - CTM respiratory status while on fluids     GASTROINTESTINAL  #Acute pancreatitis   - 2/2 to triglycerides   - Abdominal pain + Lipase  - CT a/p pending to assess for fluid collection, necrosis or abscess   - NPO except medications   - D5/LR 100cc/hr  - BISAP score 0, Ritesh: 1      ENDOCRINE  #Hypertriglyceridemia   - Unclear etiology   - 10/19: Triglycerides 2239 on admission  - Trend triglycerides q12h   - Insulin ggt at 5u/hour reduced dose from 0.1u/kg   - Will start fenofibrate, atorvastatin and lovaza pending discussion with Endocrinology   - Endocrinology consulted     #DKA  - Mild DKA   -----Ph: 7.24, Bicarb 22, , + Ketones in serum and urine   - 10/19: In ED: 5 units of insulin, 2L of fluid 1 NS and 1LR  - UA: Ketones and glucose +, BHB 4.4   - a1c 12   - Insulin gtt  - Endocrinology consulted     INFECTIOUS DISEASE  - Afebrile, denies s/s of infection  - UA w/o evidence of infection   - CXR w/o evidence of infection  - No active issues.  HEMATOLOGY/ONCOLOGY  - No active issues  - Follow CT a/p     DVT/PE ppx: Lovenox 40mg   Ethics: Full code        Patient is a 42-year-old male w/ pmh of HTN (diet-controlled) presenting w/ abdominal pain associated w/ N/V in the context of  polydipsia, polyuria and unintentional weight loss. Found to have mild DKA in the setting of acute hypertriglyceridemia-induced acute pancreatitis. Consideration for underlying malignancy or autoimmune pathology cannot be excluded.     PLAN:    NEURO    #Pain   - A/Ox4 at baseline  - Neuro checks q4  - Pain: s/p 4mg morphine IVPx1 in ED  ---- Mild pain: Acetaminophen 650mg PO   ---- Moderate pain: Morphine 2mg q6 hours IV   ---- Severe pain: Morphine 4mg q6 hours IV     CARDIOVASCULAR    #HTN   - Home meds: None   - Would start ACEi/ARB if diabetic   ---- Lisinopril 10mg PO daily    #ASCVD Risk:   - Patient endorses long-standing history of hypertension, managed with diet only  - Lipid Profile: Total Cholesterol 408, Triglycerides 2025, HDL 18 mg/dL, Non HDL cholesterol 390   - f/u Lipoprotein A and Apoliprotein B       PULMONARY  - No active issues  - CTM respiratory status while on fluids       GASTROINTESTINAL    #Acute pancreatitis   - 2/2 to triglycerides   - Abdominal pain + Lipase  - CT a/p pending to assess for fluid collection, necrosis or abscess   - NPO except medications   - D5/LR 100cc/hr  - BISAP score 0, Ritesh: 1  - Total Ca 8.9, ionized 1.08  ----- supplement calcium gluconate 2g       ENDOCRINE    #Hypertriglyceridemia   - Unclear etiology   - 10/19: Triglycerides 2339 on admission  - Trend triglycerides q12h   - Insulin ggt at 5u/hour reduced dose from 0.1u/kg   - Fenofibrate 145mg PO daily  - Lovaza 4mg PO daily  - Atorvastatin 40mg PO daily  - Endocrinology consulted, appreciate recs  - f/u Zinc transporter 8 ab    #Hyperlipidemia  - Lipid Profile: Total Cholesterol 408, Triglycerides 2025, HDL 18 mg/dL, Non HDL cholesterol 390   - f/u Lipoprotein A and Apoliprotein B       #DKA  - Mild DKA      - Ph: 7.24, Bicarb 22, , + Ketones in serum and urine   - 10/19: In ED: 5 units of insulin, 2L of fluid 1 NS and 1LR  - UA: Ketones and glucose +, BHB 4.4   - a1c 12%  - Insulin gtt 6cc/hr  - Endocrinology consulted   - f/u Glutamic acid decarboxylase abs, zinc transporter 8 abs, islet cell ab, and IA-2 ab (to r/o T1DM/RYAN)  - f/u C-peptide and CK    INFECTIOUS DISEASE    - Afebrile, denies s/s of infection  - UA w/o evidence of infection   - CXR w/o evidence of infection  - No active issues.    HEMATOLOGY/ONCOLOGY    - No active issues  - Follow CT a/p     DVT/PE ppx: Lovenox 40mg     ETHICS: Full code        Patient is a 42-year-old male w/ pmh of HTN (diet-controlled) presenting w/ abdominal pain associated w/ N/V in the context of  polydipsia, polyuria and unintentional weight loss. Found to have mild DKA in the setting of acute hypertriglyceridemia-induced acute pancreatitis. Consideration for underlying malignancy or autoimmune pathology cannot be excluded.     PLAN:    NEURO    #Pain   - A/Ox4 at baseline  - Neuro checks q4  - Pain: s/p 4mg morphine IVPx1 in ED  ---- Mild pain: Acetaminophen 650mg PO   ---- Moderate pain: Morphine 2mg q6 hours IV   ---- Severe pain: Morphine 4mg q6 hours IV     CARDIOVASCULAR    #HTN   - Home meds: None   - Would start ACEi/ARB if diabetic   ---- Lisinopril 10mg PO daily    #ASCVD Risk:   - Patient endorses long-standing history of hypertension, managed with diet only  - Lipid Profile: Total Cholesterol 408, Triglycerides 2025, HDL 18 mg/dL, Non HDL cholesterol 390   - f/u Lipoprotein A and Apoliprotein B       PULMONARY  - No active issues  - CTM respiratory status while on fluids       GASTROINTESTINAL    #Acute pancreatitis   - 2/2 to triglycerides   - Abdominal pain + Lipase  - CT a/p pending to assess for fluid collection, necrosis or abscess   - NPO except medications   - D5/LR 100cc/hr  - BISAP score 0, Ritesh: 1  - Total Ca 8.9, ionized 1.08  ----- supplement calcium gluconate 2g       ENDOCRINE    #Hypertriglyceridemia   - Unclear etiology   - 10/19: Triglycerides 2339 on admission  - Trend triglycerides q12h   - Insulin ggt at 5u/hour reduced dose from 0.1u/kg   - Fenofibrate 145mg PO daily  - Lovaza 4mg PO daily  - Atorvastatin 40mg PO daily  - Endocrinology consulted, appreciate recs  - f/u Zinc transporter 8 ab    #Hyperlipidemia  - Lipid Profile: Total Cholesterol 408, Triglycerides 2025, HDL 18 mg/dL, Non HDL cholesterol 390   - f/u Lipoprotein A and Apoliprotein B       #DKA  - Mild DKA      - Ph: 7.24, Bicarb 22, , + Ketones in serum and urine   - 10/19: In ED: 5 units of insulin, 2L of fluid 1 NS and 1LR  - UA: Ketones and glucose +, BHB 4.4   - a1c 12%  - Insulin gtt 6cc/hr target glucose 150-200  - Endocrinology consulted   - f/u Glutamic acid decarboxylase abs, zinc transporter 8 abs, islet cell ab, and IA-2 ab (to r/o T1DM/RYAN)  - f/u C-peptide and CK    INFECTIOUS DISEASE    - Afebrile, denies s/s of infection  - UA w/o evidence of infection   - CXR w/o evidence of infection  - No active issues.    HEMATOLOGY/ONCOLOGY    - No active issues  - Follow CT a/p     DVT/PE ppx: Lovenox 40mg     ETHICS: Full code        Patient is a 42-year-old male w/ pmh of HTN (diet-controlled) presenting w/ abdominal pain associated w/ N/V in the context of  polydipsia, polyuria and unintentional weight loss. Found to have mild DKA in the setting of acute hypertriglyceridemia-induced acute pancreatitis. Consideration for underlying malignancy or autoimmune pathology cannot be excluded.     PLAN:    NEURO    #Pain   - A/Ox4 at baseline  - Neuro checks q4  - Pain: s/p 4mg morphine IVPx1 in ED  ---- Mild pain: Acetaminophen 650mg PO   ---- Moderate pain: Morphine 2mg q6 hours IV   ---- Severe pain: Morphine 4mg q6 hours IV     CARDIOVASCULAR    #HTN   - Home meds: None   - Would start ACEi/ARB if diabetic   ---- Lisinopril 10mg PO daily    #ASCVD Risk:   - Patient endorses long-standing history of hypertension, managed with diet only  - Lipid Profile: Total Cholesterol 408, Triglycerides 2025, HDL 18 mg/dL, Non HDL cholesterol 390   - f/u Lipoprotein A and Apoliprotein B       PULMONARY  - No active issues  - CTM respiratory status while on fluids       GASTROINTESTINAL    #Acute pancreatitis   - 2/2 to triglycerides   - Abdominal pain + Lipase  - CT a/p pending to assess for fluid collection, necrosis or abscess   - NPO except medications   - D5/LR 100cc/hr  - BISAP score 0, Ritesh: 1  - Total Ca 8.9, ionized 1.08  ----- supplement calcium gluconate 2g       ENDOCRINE    #Hypertriglyceridemia   - Unclear etiology   - 10/19: Triglycerides 2339 on admission  - Trend triglycerides q12h   - Insulin ggt at 5u/hour reduced dose from 0.1u/kg   - Fenofibrate 145mg PO daily  - Lovaza 4mg PO daily  - Atorvastatin 40mg PO daily  - Endocrinology consulted, appreciate recs  - f/u Zinc transporter 8 ab  - Will require outpatient follow with Dr. Calderon (Lipidologist)     #Hyperlipidemia  - Lipid Profile: Total Cholesterol 408, Triglycerides 2025, HDL 18 mg/dL, Non HDL cholesterol 390   - f/u Lipoprotein A and Apoliprotein B       #DKA  - Mild DKA      - Ph: 7.24, Bicarb 22, , + Ketones in serum and urine   - 10/19: In ED: 5 units of insulin, 2L of fluid 1 NS and 1LR  - UA: Ketones and glucose +, BHB 4.4   - a1c 12%  - Insulin gtt 6cc/hr target glucose 150-200  - Endocrinology consulted   - f/u Glutamic acid decarboxylase abs, zinc transporter 8 abs, islet cell ab, and IA-2 ab (to r/o T1DM/RYAN)  - f/u C-peptide and CK    INFECTIOUS DISEASE    - Afebrile, denies s/s of infection  - UA w/o evidence of infection   - CXR w/o evidence of infection  - No active issues.    HEMATOLOGY/ONCOLOGY    - No active issues  - Follow CT a/p     DVT/PE ppx: Lovenox 40mg     ETHICS: Full code

## 2022-10-19 NOTE — ED PROVIDER NOTE - CARE PLAN
1 Principal Discharge DX:	DKA (diabetic ketoacidosis)  Secondary Diagnosis:	Pancreatitis  Secondary Diagnosis:	Hypertriglyceridemia

## 2022-10-19 NOTE — ED ADULT NURSE NOTE - NS ED NOTE ABUSE RESPONSE YN
Awaiting INR drawn this am. Called ACL and specimen has not been dropped off yet to be processed.  Please follow guideline for dose adjustment and monitoring new warfarin starts for pt >75 . Pt presently on 1 mg Q pm.  LMOAM for pt and also spoke w tuyett Chelly.  Advised if they do not hear from After Hrs Triage pt to take 1 mg Q PM. Dgt reports no bleeding or bruising issues or concerns.  Pt will need to check INR on Monday as well, advised we will most likely not get result until Tuesday.   Yes

## 2022-10-19 NOTE — ED PROVIDER NOTE - CLINICAL SUMMARY MEDICAL DECISION MAKING FREE TEXT BOX
42-year-old male past medical history of hypertension (diet-controlled) here complaining of epigastric abdominal pain since last night, now worsening, associated with an episode of vomiting here in the ED. Patient hyperglycemic here abdomen is soft and no guarding, suspicion for DKA.  Labs/beta hydroxybutyrate/hemoglobin A1c, hydration, UA/chest x-ray to rule out infectious etiology, likely admit versus CDU for glucose control

## 2022-10-19 NOTE — ED ADULT TRIAGE NOTE - CHIEF COMPLAINT QUOTE
alert oriented c/o abd pain no N/V/D started 0300  on scene  also c/o frequent urination x 3 weeks  PMHx HTN  also c/o dizziness

## 2022-10-19 NOTE — ED ADULT NURSE REASSESSMENT NOTE - NS ED NURSE REASSESS COMMENT FT1
hand off report given to Lorrie WEBSTER on receiving unit. Respirations even and unlabored. Medicated as per MD orders. FS's performed. Patient to CT scan prior to transport to unit. Stable for transport no signs of acute distress noted Helder WEBSTER

## 2022-10-19 NOTE — H&P ADULT - NSHPLABSRESULTS_GEN_ALL_CORE
14.5   15.67 )-----------( 251      ( 19 Oct 2022 08:00 )             43.3       10-19-22 @ 12:20    134<L>  |  101  |  10             --------------------------< 214<H>     TNP  |  16<L>  | 0.54    eGFR AA: --  eGFR N-AA: --    Calcium: 8.9  Phosphorus: --  Magnesium: --    AST: --    ALT: --  AlkPhos: --  Protein: --  Albumin: --  TBili: --  D-Bili: --  10-19-22 @ 09:35    131<L>  |  97<L>  |  12             --------------------------< 293<H>     TNP  |  17<L>  | 0.61    eGFR AA: --  eGFR N-AA: --    Calcium: 8.9  Phosphorus: --  Magnesium: --    AST: --    ALT: --  AlkPhos: --  Protein: --  Albumin: --  TBili: --  D-Bili: --  10-19-22 @ 08:00    128<L>  |  92<L>  |  13             --------------------------< 334<H>     5.2  |  19<L>  | 0.65    eGFR AA: --  eGFR N-AA: --    Calcium: 9.4  Phosphorus: --  Magnesium: --    AST: 34    ALT: 25  AlkPhos: 101  Protein: 6.5  Albumin: 4.3  TBili: 0.5  D-Bili: -- 14.5   15.67 )-----------( 251      ( 19 Oct 2022 08:00 )             43.3         10-19    134<L>  |  101  |  10  ----------------------------<  214<H>  TNP   |  16<L>  |  0.54    Ca    8.9      19 Oct 2022 12:20    TPro  6.5  /  Alb  4.3  /  TBili  0.5  /  DBili  x   /  AST  34  /  ALT  25  /  AlkPhos  101  10-19      Calcium: 8.9  Phosphorus: --  Magnesium: --    Lipid Profile (10.19.22 @ 09:35)    Cholesterol, Serum: 428 mg/dL    Triglycerides, Serum: 2339 mg/dL    HDL Cholesterol, Serum: 16 mg/dL    Non HDL Cholesterol: 412    Urinalysis (10.19.22 @ 09:22)    pH Urine: 5.5    Glucose Qualitative, Urine: >= 1000 mg/dL    Blood, Urine: Negative    Color: Light Yellow    Urine Appearance: Clear    Bilirubin: Negative    Ketone - Urine: Large    Specific Gravity: 1.041    Protein, Urine: 30 mg/dL    Urobilinogen: <2 mg/dL    Nitrite: Negative    Leukocyte Esterase Concentration: Negative    Blood Gas Profile - Venous (10.19.22 @ 12:20)    pH, Venous: 7.24    pCO2, Venous: 49 mmHg    pO2, Venous: 32 mmHg    HCO3, Venous: 21 mmol/L    Base Excess, Venous: -6.7 mmol/L    Oxygen Saturation, Venous: 51.9 %    Total CO2, Venous: 22.5 mmol/L    AG 17    Beta Hydroxy-Butyrate: 4.0 mmol/L (10.19.22 @ 09:35)    Hgb A1c: 12.0%  Estimated Average Glucose: 298 (10.19.22 @ 08:00)    Lipase, Serum (10.19.22 @ 08:00)    Lipase, Serum: >3000 U/L    RADIOLOGY    CXR demonstrating clear lungs.

## 2022-10-19 NOTE — PATIENT PROFILE ADULT - FALL HARM RISK - UNIVERSAL INTERVENTIONS
Bed in lowest position, wheels locked, appropriate side rails in place/Call bell, personal items and telephone in reach/Instruct patient to call for assistance before getting out of bed or chair/Non-slip footwear when patient is out of bed/[place] to call system/Physically safe environment - no spills, clutter or unnecessary equipment/Purposeful Proactive Rounding/Room/bathroom lighting operational, light cord in reach

## 2022-10-19 NOTE — ED ADULT NURSE NOTE - OBJECTIVE STATEMENT
pt received to room 11 , a&ox 4, ambulatory , p/w lower abdominal pain awakening from sleep. Pt verbalized he does not have a PCP. Blood as noted in triage. Pt breathing even and unlabored on room air.  Denies fever, chills, cough, SOB, chest pain, palpitations. 18G IV placed to RAC. Labs collected and sent. EKG in chart. XR pending . Stretcher in lowest position, wheels locked, appropriate side rails in place, call bell in reach.

## 2022-10-19 NOTE — ED PROVIDER NOTE - OBJECTIVE STATEMENT
42-year-old male past medical history of hypertension (diet-controlled) here complaining of epigastric abdominal pain since last night, now worsening, associated with an episode of vomiting here in the ED.  States he may have had bad tuna yesterday.  Also admits to polydipsia, polyuria, and dry mouth over the past several weeks.  Fingerstick elevated in triage, patient denies any history of diabetes.  Admits to a 20 pound weight loss over the past month.  No recent illness.  Denies dysuria or cough.

## 2022-10-19 NOTE — H&P ADULT - NSHPPHYSICALEXAM_GEN_ALL_CORE
PHYSICAL EXAM:   Physical Examination: Vital signs reviewed.   CONSTITUTIONAL: Well-appearing; well-nourished; in no apparent distress. Non-toxic appearing.   HEAD: Normocephalic, atraumatic.  EYES: PERRL, EOM intact, conjunctiva and sclera WNL.  ENT: dry mucous membranes   NECK/LYMPH: Supple; non-tender; no cervical lymphadenopathy.  CARD: Normal S1, S2; RRR  RESP: Normal chest excursion with respiration; breath sounds clear and equal bilaterally; no wheezes, rhonchi, or rales.  ABD/GI: soft, non-distended; LUQ tenderness, no guarding  EXT/MS: moves all extremities; distal pulses are normal, no pedal edema.  SKIN: Normal for age and race; warm; dry; good turgor; no apparent lesions or exudate noted.  NEURO: Awake, alert, oriented x 3, no gross deficits, CN II-XII grossly intact, no motor or sensory deficit noted.  PSYCH: Normal mood; appropriate affect.

## 2022-10-19 NOTE — CONSULT NOTE ADULT - ATTENDING COMMENTS
42M with HTN presenting with abdominal pain, found to have HTG pancreatitis and DKA  insulin drip as discussed with team this morning given DKA and Pancreatitis   will need to address DKA and high TG  suspicion for type 1 - d/w pt concern for type 1 DM and need for insulin at dc   Agree with basal/bolus plan as outlined, adjust as plan above   d/w pt in detail regarding lifestyle changes with carb controlled diet, monitoring FSBG, exercise  Endocrine team consulted for uncontrolled diabetes. Patient is high risk with high level decision making due to uncontrolled diabetes which places patient at high risk for cardiovascular and cerebrovascular events. Patient with lability of glucose requiring close monitoring and insulin adjustments.

## 2022-10-19 NOTE — CONSULT NOTE ADULT - SUBJECTIVE AND OBJECTIVE BOX
HPI:  42M with HTN presenting with abdominal pain, found to have HTG pancreatitis and DKA    A1c 12%  pH 7.28, BHB 4.4, CO2 19, AG 17. TG 2339  Admitted to ICU for insulin gtt initiation    PAST MEDICAL & SURGICAL HISTORY:  HTN (hypertension)      No significant past surgical history          FAMILY HISTORY:      Social History:    Home Medications:      MEDICATIONS  (STANDING):  chlorhexidine 2% Cloths 1 Application(s) Topical <User Schedule>  dextrose 5% + lactated ringers. 1000 milliLiter(s) (100 mL/Hr) IV Continuous <Continuous>  dextrose 5%. 1000 milliLiter(s) (50 mL/Hr) IV Continuous <Continuous>  dextrose 5%. 1000 milliLiter(s) (100 mL/Hr) IV Continuous <Continuous>  dextrose 50% Injectable 25 Gram(s) IV Push once  dextrose 50% Injectable 12.5 Gram(s) IV Push once  dextrose 50% Injectable 25 Gram(s) IV Push once  glucagon  Injectable 1 milliGRAM(s) IntraMuscular once  insulin regular Infusion 5 Unit(s)/Hr (5 mL/Hr) IV Continuous <Continuous>    MEDICATIONS  (PRN):  dextrose Oral Gel 15 Gram(s) Oral once PRN Blood Glucose LESS THAN 70 milliGRAM(s)/deciliter      Allergies    No Known Allergies    Intolerances      Review of Systems:  Constitutional: No fever  Eyes: No blurry vision  Neuro: No tremors  HEENT: No pain  Cardiovascular: No chest pain, palpitations  Respiratory: No SOB, no cough  GI: No nausea, vomiting, abdominal pain  : No dysuria  Skin: no rash  Psych: no depression  Endocrine: no polyuria, polydipsia  Hem/lymph: no swelling  Osteoporosis: no fractures    PHYSICAL EXAM:  VITALS: T(C): 36.7 (10-19-22 @ 08:54)  T(F): 98 (10-19-22 @ 08:54), Max: 98.1 (10-19-22 @ 07:29)  HR: 86 (10-19-22 @ 08:54) (78 - 86)  BP: 145/85 (10-19-22 @ 08:54) (145/85 - 160/106)  RR:  (15 - 16)  SpO2:  (100% - 100%)  Wt(kg): --  GENERAL: NAD  EYES: No proptosis, no lid lag, anicteric  THYROID: Normal size, no palpable nodules  RESPIRATORY: Clear to auscultation bilaterally  CARDIOVASCULAR: Regular rate and rhythm  GI: Soft, nontender, non distended  EXT: b/l feet without wounds; 2+ pulses  PSYCH: Alert and oriented x 3, reactive mood    POCT Blood Glucose.: 216 mg/dL (10-19-22 @ 12:09)  POCT Blood Glucose.: 220 mg/dL (10-19-22 @ 11:34)  POCT Blood Glucose.: 278 mg/dL (10-19-22 @ 10:12)  POCT Blood Glucose.: 348 mg/dL (10-19-22 @ 07:33)                            14.5   15.67 )-----------( 251      ( 19 Oct 2022 08:00 )             43.3       10-19    131<L>  |  97<L>  |  12  ----------------------------<  293<H>  TNP   |  17<L>  |  0.61    eGFR: 125    Ca    8.9      10-19    TPro  6.5  /  Alb  4.3  /  TBili  0.5  /  DBili  x   /  AST  34  /  ALT  25  /  AlkPhos  101  10-19      Thyroid Function Tests:      A1C with Estimated Average Glucose Result: 12.0 % (10-19-22 @ 08:00)      10-19 Chol 408<H> Direct LDL -- LDL calculated Unable to calc Verified by Discern HDL 18<L> Trig 2025<H>, 10-19 Chol 428<H> Direct LDL -- LDL calculated Unable to calc Verified by Discern HDL 16<L> Trig 2339<H>    Radiology:                HPI:  42M with HTN presenting with abdominal pain, found to have HTG pancreatitis and DKA    A1c 12%  pH 7.28, BHB 4.4, CO2 19, AG 17. TG 2339  Admitted to ICU for insulin gtt initiation    Patient has no hx of DM, pre-DM or high triglycerides in the past. His last PCP appt was 1 year ago and everything was wnl  He is active with cardio and eats a well balanced diet.   For the past 2 months, he has had polyuria, polydipsia, and unintentional 20 lb weight loss    PAST MEDICAL & SURGICAL HISTORY:  HTN (hypertension)      No significant past surgical history          FAMILY HISTORY: T2DM in grandmother      Social History: No tobacco or alcohol use    Home Medications: None      MEDICATIONS  (STANDING):  chlorhexidine 2% Cloths 1 Application(s) Topical <User Schedule>  dextrose 5% + lactated ringers. 1000 milliLiter(s) (100 mL/Hr) IV Continuous <Continuous>  dextrose 5%. 1000 milliLiter(s) (50 mL/Hr) IV Continuous <Continuous>  dextrose 5%. 1000 milliLiter(s) (100 mL/Hr) IV Continuous <Continuous>  dextrose 50% Injectable 25 Gram(s) IV Push once  dextrose 50% Injectable 12.5 Gram(s) IV Push once  dextrose 50% Injectable 25 Gram(s) IV Push once  glucagon  Injectable 1 milliGRAM(s) IntraMuscular once  insulin regular Infusion 5 Unit(s)/Hr (5 mL/Hr) IV Continuous <Continuous>    MEDICATIONS  (PRN):  dextrose Oral Gel 15 Gram(s) Oral once PRN Blood Glucose LESS THAN 70 milliGRAM(s)/deciliter      Allergies    No Known Allergies    Intolerances      Review of Systems:  Constitutional: No fever  Eyes: No blurry vision  Neuro: No tremors  HEENT: No pain  Cardiovascular: No chest pain, palpitations  Respiratory: No SOB, no cough  GI: No nausea, vomiting, abdominal pain  : No dysuria  Skin: no rash  Psych: no depression  Endocrine: no polyuria, polydipsia  Hem/lymph: no swelling  Osteoporosis: no fractures    PHYSICAL EXAM:  VITALS: T(C): 36.7 (10-19-22 @ 08:54)  T(F): 98 (10-19-22 @ 08:54), Max: 98.1 (10-19-22 @ 07:29)  HR: 86 (10-19-22 @ 08:54) (78 - 86)  BP: 145/85 (10-19-22 @ 08:54) (145/85 - 160/106)  RR:  (15 - 16)  SpO2:  (100% - 100%)  Wt(kg): --  GENERAL: NAD  EYES: No proptosis, no lid lag, anicteric  THYROID: Normal size, no palpable nodules  RESPIRATORY: Clear to auscultation bilaterally  CARDIOVASCULAR: Regular rate and rhythm  GI: Soft, nontender, non distended  EXT: b/l feet without wounds; 2+ pulses  PSYCH: Alert and oriented x 3, reactive mood    POCT Blood Glucose.: 216 mg/dL (10-19-22 @ 12:09)  POCT Blood Glucose.: 220 mg/dL (10-19-22 @ 11:34)  POCT Blood Glucose.: 278 mg/dL (10-19-22 @ 10:12)  POCT Blood Glucose.: 348 mg/dL (10-19-22 @ 07:33)                            14.5   15.67 )-----------( 251      ( 19 Oct 2022 08:00 )             43.3       10-19    131<L>  |  97<L>  |  12  ----------------------------<  293<H>  TNP   |  17<L>  |  0.61    eGFR: 125    Ca    8.9      10-19    TPro  6.5  /  Alb  4.3  /  TBili  0.5  /  DBili  x   /  AST  34  /  ALT  25  /  AlkPhos  101  10-19      Thyroid Function Tests:      A1C with Estimated Average Glucose Result: 12.0 % (10-19-22 @ 08:00)      10-19 Chol 408<H> Direct LDL -- LDL calculated Unable to calc Verified by Discern HDL 18<L> Trig 2025<H>, 10-19 Chol 428<H> Direct LDL -- LDL calculated Unable to calc Verified by Discern HDL 16<L> Trig 2339<H>    Radiology:                HPI:  42M with HTN presenting with abdominal pain, found to have HTG pancreatitis and DKA    A1c 12%  pH 7.28, BHB 4.4, CO2 19, AG 17. TG 2339  Admitted to ICU for insulin gtt initiation    Patient has no hx of DM, pre-DM or high triglycerides in the past. His last PCP appt was 1 year ago and everything was wnl  He is active with cardio and eats a well balanced diet.   For the past 2 months, he has had polyuria, polydipsia, and unintentional 20 lb weight loss        PAST MEDICAL & SURGICAL HISTORY:  HTN (hypertension)      No significant past surgical history          FAMILY HISTORY: T2DM in grandmother      Social History: No tobacco or alcohol use    Home Medications: None      MEDICATIONS  (STANDING):  chlorhexidine 2% Cloths 1 Application(s) Topical <User Schedule>  dextrose 5% + lactated ringers. 1000 milliLiter(s) (100 mL/Hr) IV Continuous <Continuous>  dextrose 5%. 1000 milliLiter(s) (50 mL/Hr) IV Continuous <Continuous>  dextrose 5%. 1000 milliLiter(s) (100 mL/Hr) IV Continuous <Continuous>  dextrose 50% Injectable 25 Gram(s) IV Push once  dextrose 50% Injectable 12.5 Gram(s) IV Push once  dextrose 50% Injectable 25 Gram(s) IV Push once  glucagon  Injectable 1 milliGRAM(s) IntraMuscular once  insulin regular Infusion 5 Unit(s)/Hr (5 mL/Hr) IV Continuous <Continuous>    MEDICATIONS  (PRN):  dextrose Oral Gel 15 Gram(s) Oral once PRN Blood Glucose LESS THAN 70 milliGRAM(s)/deciliter      Allergies    No Known Allergies    Intolerances      Review of Systems:  Constitutional: No fever  Eyes: No blurry vision  Neuro: No tremors  HEENT: No pain  Cardiovascular: No chest pain, palpitations  Respiratory: No SOB, no cough  GI: No nausea, vomiting, abdominal pain  : No dysuria  Skin: no rash  Psych: no depression  Endocrine: no polyuria, polydipsia  Hem/lymph: no swelling  Osteoporosis: no fractures    PHYSICAL EXAM:  VITALS: T(C): 36.7 (10-19-22 @ 08:54)  T(F): 98 (10-19-22 @ 08:54), Max: 98.1 (10-19-22 @ 07:29)  HR: 86 (10-19-22 @ 08:54) (78 - 86)  BP: 145/85 (10-19-22 @ 08:54) (145/85 - 160/106)  RR:  (15 - 16)  SpO2:  (100% - 100%)  Wt(kg): --  GENERAL: NAD  EYES: No proptosis, no lid lag, anicteric  THYROID: Normal size, no palpable nodules  RESPIRATORY: Clear to auscultation bilaterally  CARDIOVASCULAR: Regular rate and rhythm  GI: Soft, nontender, non distended  EXT: b/l feet without wounds; 2+ pulses  PSYCH: Alert and oriented x 3, reactive mood    POCT Blood Glucose.: 216 mg/dL (10-19-22 @ 12:09)  POCT Blood Glucose.: 220 mg/dL (10-19-22 @ 11:34)  POCT Blood Glucose.: 278 mg/dL (10-19-22 @ 10:12)  POCT Blood Glucose.: 348 mg/dL (10-19-22 @ 07:33)                            14.5   15.67 )-----------( 251      ( 19 Oct 2022 08:00 )             43.3       10-19    131<L>  |  97<L>  |  12  ----------------------------<  293<H>  TNP   |  17<L>  |  0.61    eGFR: 125    Ca    8.9      10-19    TPro  6.5  /  Alb  4.3  /  TBili  0.5  /  DBili  x   /  AST  34  /  ALT  25  /  AlkPhos  101  10-19      Thyroid Function Tests:      A1C with Estimated Average Glucose Result: 12.0 % (10-19-22 @ 08:00)      10-19 Chol 408<H> Direct LDL -- LDL calculated Unable to calc Verified by Discern HDL 18<L> Trig 2025<H>, 10-19 Chol 428<H> Direct LDL -- LDL calculated Unable to calc Verified by Discern HDL 16<L> Trig 2339<H>    Radiology:

## 2022-10-19 NOTE — H&P ADULT - ATTENDING COMMENTS
Patient seen and examined at bedside with the MICU Resident. Agree with above.     42-year-old male w/ pmh of HTN (diet-controlled) presenting w/ abdominal pain associated w/ N/V in the context of  polydipsia, polyuria and unintentional weight loss. Patient feeling better since admission but found to have TG induced pancreatitis as well as mild DKA    # Pancreatitis  # DKA  # Hypertriglceridemia  # Epigastric abn pain  - IVF, Pain control  - insulin gtt for both mild DKA and TG induced pancreatitis  - Would aim for the lowest dose of insulin without needing large volume of D5LR as possible once GAP is closed  - NPO for now  - F/U offical CT scan results  - Start statins, maritaley add fibrates  - Admit to MICU.

## 2022-10-20 LAB
ALBUMIN SERPL ELPH-MCNC: 3.4 G/DL — SIGNIFICANT CHANGE UP (ref 3.3–5)
ALBUMIN SERPL ELPH-MCNC: 3.5 G/DL — SIGNIFICANT CHANGE UP (ref 3.3–5)
ALBUMIN SERPL ELPH-MCNC: 3.6 G/DL — SIGNIFICANT CHANGE UP (ref 3.3–5)
ALBUMIN SERPL ELPH-MCNC: 3.6 G/DL — SIGNIFICANT CHANGE UP (ref 3.3–5)
ALBUMIN SERPL ELPH-MCNC: 3.7 G/DL — SIGNIFICANT CHANGE UP (ref 3.3–5)
ALP SERPL-CCNC: 74 U/L — SIGNIFICANT CHANGE UP (ref 40–120)
ALP SERPL-CCNC: 76 U/L — SIGNIFICANT CHANGE UP (ref 40–120)
ALP SERPL-CCNC: 76 U/L — SIGNIFICANT CHANGE UP (ref 40–120)
ALP SERPL-CCNC: 77 U/L — SIGNIFICANT CHANGE UP (ref 40–120)
ALP SERPL-CCNC: 78 U/L — SIGNIFICANT CHANGE UP (ref 40–120)
ALT FLD-CCNC: 18 U/L — SIGNIFICANT CHANGE UP (ref 4–41)
ALT FLD-CCNC: 19 U/L — SIGNIFICANT CHANGE UP (ref 4–41)
ALT FLD-CCNC: 20 U/L — SIGNIFICANT CHANGE UP (ref 4–41)
ALT FLD-CCNC: 22 U/L — SIGNIFICANT CHANGE UP (ref 4–41)
ALT FLD-CCNC: 22 U/L — SIGNIFICANT CHANGE UP (ref 4–41)
ANION GAP SERPL CALC-SCNC: 11 MMOL/L — SIGNIFICANT CHANGE UP (ref 7–14)
ANION GAP SERPL CALC-SCNC: 11 MMOL/L — SIGNIFICANT CHANGE UP (ref 7–14)
ANION GAP SERPL CALC-SCNC: 12 MMOL/L — SIGNIFICANT CHANGE UP (ref 7–14)
ANION GAP SERPL CALC-SCNC: 13 MMOL/L — SIGNIFICANT CHANGE UP (ref 7–14)
ANION GAP SERPL CALC-SCNC: 14 MMOL/L — SIGNIFICANT CHANGE UP (ref 7–14)
APO A-I SERPL-MCNC: 69 MG/DL — LOW (ref 104–202)
APO A-I/APO B SERPL: 1.19 RATIO — SIGNIFICANT CHANGE UP
APO B SERPL-MCNC: 82 MG/DL — SIGNIFICANT CHANGE UP (ref 66–133)
AST SERPL-CCNC: 15 U/L — SIGNIFICANT CHANGE UP (ref 4–40)
AST SERPL-CCNC: 16 U/L — SIGNIFICANT CHANGE UP (ref 4–40)
AST SERPL-CCNC: 16 U/L — SIGNIFICANT CHANGE UP (ref 4–40)
AST SERPL-CCNC: 18 U/L — SIGNIFICANT CHANGE UP (ref 4–40)
AST SERPL-CCNC: 19 U/L — SIGNIFICANT CHANGE UP (ref 4–40)
B PERT DNA SPEC QL NAA+PROBE: SIGNIFICANT CHANGE UP
B PERT+PARAPERT DNA PNL SPEC NAA+PROBE: SIGNIFICANT CHANGE UP
B-OH-BUTYR SERPL-SCNC: 0.7 MMOL/L — HIGH (ref 0–0.4)
BASE EXCESS BLDV CALC-SCNC: -0.2 MMOL/L — SIGNIFICANT CHANGE UP (ref -2–3)
BASE EXCESS BLDV CALC-SCNC: -0.2 MMOL/L — SIGNIFICANT CHANGE UP (ref -2–3)
BASE EXCESS BLDV CALC-SCNC: -0.5 MMOL/L — SIGNIFICANT CHANGE UP (ref -2–3)
BASE EXCESS BLDV CALC-SCNC: -0.5 MMOL/L — SIGNIFICANT CHANGE UP (ref -2–3)
BASE EXCESS BLDV CALC-SCNC: -1.4 MMOL/L — SIGNIFICANT CHANGE UP (ref -2–3)
BASE EXCESS BLDV CALC-SCNC: 0.7 MMOL/L — SIGNIFICANT CHANGE UP (ref -2–3)
BASOPHILS # BLD AUTO: 0.04 K/UL — SIGNIFICANT CHANGE UP (ref 0–0.2)
BASOPHILS # BLD AUTO: 0.04 K/UL — SIGNIFICANT CHANGE UP (ref 0–0.2)
BASOPHILS NFR BLD AUTO: 0.3 % — SIGNIFICANT CHANGE UP (ref 0–2)
BASOPHILS NFR BLD AUTO: 0.4 % — SIGNIFICANT CHANGE UP (ref 0–2)
BILIRUB SERPL-MCNC: 0.5 MG/DL — SIGNIFICANT CHANGE UP (ref 0.2–1.2)
BILIRUB SERPL-MCNC: 0.6 MG/DL — SIGNIFICANT CHANGE UP (ref 0.2–1.2)
BLOOD GAS VENOUS COMPREHENSIVE RESULT: SIGNIFICANT CHANGE UP
BORDETELLA PARAPERTUSSIS (RAPRVP): SIGNIFICANT CHANGE UP
BUN SERPL-MCNC: 5 MG/DL — LOW (ref 7–23)
BUN SERPL-MCNC: 6 MG/DL — LOW (ref 7–23)
BUN SERPL-MCNC: 6 MG/DL — LOW (ref 7–23)
C PEPTIDE SERPL-MCNC: 1.2 NG/ML — SIGNIFICANT CHANGE UP (ref 1.1–4.4)
C PNEUM DNA SPEC QL NAA+PROBE: SIGNIFICANT CHANGE UP
CA-I BLD-SCNC: 1.08 MMOL/L — LOW (ref 1.15–1.29)
CALCIUM SERPL-MCNC: 9.2 MG/DL — SIGNIFICANT CHANGE UP (ref 8.4–10.5)
CALCIUM SERPL-MCNC: 9.3 MG/DL — SIGNIFICANT CHANGE UP (ref 8.4–10.5)
CALCIUM SERPL-MCNC: 9.3 MG/DL — SIGNIFICANT CHANGE UP (ref 8.4–10.5)
CALCIUM SERPL-MCNC: 9.4 MG/DL — SIGNIFICANT CHANGE UP (ref 8.4–10.5)
CALCIUM SERPL-MCNC: 9.4 MG/DL — SIGNIFICANT CHANGE UP (ref 8.4–10.5)
CHLORIDE BLDV-SCNC: 101 MMOL/L — SIGNIFICANT CHANGE UP (ref 96–108)
CHLORIDE BLDV-SCNC: 101 MMOL/L — SIGNIFICANT CHANGE UP (ref 96–108)
CHLORIDE BLDV-SCNC: 102 MMOL/L — SIGNIFICANT CHANGE UP (ref 96–108)
CHLORIDE BLDV-SCNC: 103 MMOL/L — SIGNIFICANT CHANGE UP (ref 96–108)
CHLORIDE BLDV-SCNC: 103 MMOL/L — SIGNIFICANT CHANGE UP (ref 96–108)
CHLORIDE SERPL-SCNC: 101 MMOL/L — SIGNIFICANT CHANGE UP (ref 98–107)
CHLORIDE SERPL-SCNC: 102 MMOL/L — SIGNIFICANT CHANGE UP (ref 98–107)
CHLORIDE SERPL-SCNC: 102 MMOL/L — SIGNIFICANT CHANGE UP (ref 98–107)
CHLORIDE SERPL-SCNC: 98 MMOL/L — SIGNIFICANT CHANGE UP (ref 98–107)
CHLORIDE SERPL-SCNC: 98 MMOL/L — SIGNIFICANT CHANGE UP (ref 98–107)
CO2 BLDV-SCNC: 23.9 MMOL/L — SIGNIFICANT CHANGE UP (ref 22–26)
CO2 BLDV-SCNC: 24.1 MMOL/L — SIGNIFICANT CHANGE UP (ref 22–26)
CO2 BLDV-SCNC: 25.1 MMOL/L — SIGNIFICANT CHANGE UP (ref 22–26)
CO2 BLDV-SCNC: 25.4 MMOL/L — SIGNIFICANT CHANGE UP (ref 22–26)
CO2 BLDV-SCNC: 26.1 MMOL/L — HIGH (ref 22–26)
CO2 BLDV-SCNC: 26.6 MMOL/L — HIGH (ref 22–26)
CO2 SERPL-SCNC: 20 MMOL/L — LOW (ref 22–31)
CO2 SERPL-SCNC: 22 MMOL/L — SIGNIFICANT CHANGE UP (ref 22–31)
CREAT SERPL-MCNC: 0.56 MG/DL — SIGNIFICANT CHANGE UP (ref 0.5–1.3)
CREAT SERPL-MCNC: 0.57 MG/DL — SIGNIFICANT CHANGE UP (ref 0.5–1.3)
CREAT SERPL-MCNC: 0.58 MG/DL — SIGNIFICANT CHANGE UP (ref 0.5–1.3)
CREAT SERPL-MCNC: 0.59 MG/DL — SIGNIFICANT CHANGE UP (ref 0.5–1.3)
CREAT SERPL-MCNC: 0.61 MG/DL — SIGNIFICANT CHANGE UP (ref 0.5–1.3)
CULTURE RESULTS: SIGNIFICANT CHANGE UP
EGFR: 125 ML/MIN/1.73M2 — SIGNIFICANT CHANGE UP
EGFR: 126 ML/MIN/1.73M2 — SIGNIFICANT CHANGE UP
EGFR: 126 ML/MIN/1.73M2 — SIGNIFICANT CHANGE UP
EGFR: 127 ML/MIN/1.73M2 — SIGNIFICANT CHANGE UP
EGFR: 128 ML/MIN/1.73M2 — SIGNIFICANT CHANGE UP
EOSINOPHIL # BLD AUTO: 0.06 K/UL — SIGNIFICANT CHANGE UP (ref 0–0.5)
EOSINOPHIL # BLD AUTO: 0.11 K/UL — SIGNIFICANT CHANGE UP (ref 0–0.5)
EOSINOPHIL NFR BLD AUTO: 0.4 % — SIGNIFICANT CHANGE UP (ref 0–6)
EOSINOPHIL NFR BLD AUTO: 1.1 % — SIGNIFICANT CHANGE UP (ref 0–6)
FLUAV SUBTYP SPEC NAA+PROBE: SIGNIFICANT CHANGE UP
FLUBV RNA SPEC QL NAA+PROBE: SIGNIFICANT CHANGE UP
GAS PNL BLDV: 131 MMOL/L — LOW (ref 136–145)
GAS PNL BLDV: 132 MMOL/L — LOW (ref 136–145)
GAS PNL BLDV: 132 MMOL/L — LOW (ref 136–145)
GAS PNL BLDV: SIGNIFICANT CHANGE UP
GLUCOSE BLDC GLUCOMTR-MCNC: 104 MG/DL — HIGH (ref 70–99)
GLUCOSE BLDC GLUCOMTR-MCNC: 113 MG/DL — HIGH (ref 70–99)
GLUCOSE BLDC GLUCOMTR-MCNC: 114 MG/DL — HIGH (ref 70–99)
GLUCOSE BLDC GLUCOMTR-MCNC: 115 MG/DL — HIGH (ref 70–99)
GLUCOSE BLDC GLUCOMTR-MCNC: 122 MG/DL — HIGH (ref 70–99)
GLUCOSE BLDC GLUCOMTR-MCNC: 135 MG/DL — HIGH (ref 70–99)
GLUCOSE BLDC GLUCOMTR-MCNC: 139 MG/DL — HIGH (ref 70–99)
GLUCOSE BLDC GLUCOMTR-MCNC: 139 MG/DL — HIGH (ref 70–99)
GLUCOSE BLDC GLUCOMTR-MCNC: 144 MG/DL — HIGH (ref 70–99)
GLUCOSE BLDC GLUCOMTR-MCNC: 144 MG/DL — HIGH (ref 70–99)
GLUCOSE BLDC GLUCOMTR-MCNC: 148 MG/DL — HIGH (ref 70–99)
GLUCOSE BLDC GLUCOMTR-MCNC: 149 MG/DL — HIGH (ref 70–99)
GLUCOSE BLDC GLUCOMTR-MCNC: 150 MG/DL — HIGH (ref 70–99)
GLUCOSE BLDC GLUCOMTR-MCNC: 150 MG/DL — HIGH (ref 70–99)
GLUCOSE BLDC GLUCOMTR-MCNC: 153 MG/DL — HIGH (ref 70–99)
GLUCOSE BLDC GLUCOMTR-MCNC: 153 MG/DL — HIGH (ref 70–99)
GLUCOSE BLDC GLUCOMTR-MCNC: 155 MG/DL — HIGH (ref 70–99)
GLUCOSE BLDC GLUCOMTR-MCNC: 155 MG/DL — HIGH (ref 70–99)
GLUCOSE BLDC GLUCOMTR-MCNC: 164 MG/DL — HIGH (ref 70–99)
GLUCOSE BLDC GLUCOMTR-MCNC: 91 MG/DL — SIGNIFICANT CHANGE UP (ref 70–99)
GLUCOSE BLDC GLUCOMTR-MCNC: 95 MG/DL — SIGNIFICANT CHANGE UP (ref 70–99)
GLUCOSE BLDV-MCNC: 108 MG/DL — HIGH (ref 70–99)
GLUCOSE BLDV-MCNC: 126 MG/DL — HIGH (ref 70–99)
GLUCOSE BLDV-MCNC: 163 MG/DL — HIGH (ref 70–99)
GLUCOSE BLDV-MCNC: 170 MG/DL — HIGH (ref 70–99)
GLUCOSE BLDV-MCNC: 175 MG/DL — HIGH (ref 70–99)
GLUCOSE SERPL-MCNC: 121 MG/DL — HIGH (ref 70–99)
GLUCOSE SERPL-MCNC: 151 MG/DL — HIGH (ref 70–99)
GLUCOSE SERPL-MCNC: 158 MG/DL — HIGH (ref 70–99)
GLUCOSE SERPL-MCNC: 163 MG/DL — HIGH (ref 70–99)
GLUCOSE SERPL-MCNC: 98 MG/DL — SIGNIFICANT CHANGE UP (ref 70–99)
HADV DNA SPEC QL NAA+PROBE: SIGNIFICANT CHANGE UP
HCO3 BLDV-SCNC: 23 MMOL/L — SIGNIFICANT CHANGE UP (ref 22–29)
HCO3 BLDV-SCNC: 23 MMOL/L — SIGNIFICANT CHANGE UP (ref 22–29)
HCO3 BLDV-SCNC: 24 MMOL/L — SIGNIFICANT CHANGE UP (ref 22–29)
HCO3 BLDV-SCNC: 24 MMOL/L — SIGNIFICANT CHANGE UP (ref 22–29)
HCO3 BLDV-SCNC: 25 MMOL/L — SIGNIFICANT CHANGE UP (ref 22–29)
HCO3 BLDV-SCNC: 25 MMOL/L — SIGNIFICANT CHANGE UP (ref 22–29)
HCOV 229E RNA SPEC QL NAA+PROBE: SIGNIFICANT CHANGE UP
HCOV HKU1 RNA SPEC QL NAA+PROBE: SIGNIFICANT CHANGE UP
HCOV NL63 RNA SPEC QL NAA+PROBE: SIGNIFICANT CHANGE UP
HCOV OC43 RNA SPEC QL NAA+PROBE: SIGNIFICANT CHANGE UP
HCT VFR BLD CALC: 36.4 % — LOW (ref 39–50)
HCT VFR BLD CALC: 38.8 % — LOW (ref 39–50)
HCT VFR BLDA CALC: 41 % — SIGNIFICANT CHANGE UP (ref 39–51)
HCT VFR BLDA CALC: 42 % — SIGNIFICANT CHANGE UP (ref 39–51)
HCT VFR BLDA CALC: 43 % — SIGNIFICANT CHANGE UP (ref 39–51)
HGB BLD CALC-MCNC: 13.8 G/DL — SIGNIFICANT CHANGE UP (ref 13–17)
HGB BLD CALC-MCNC: 14.1 G/DL — SIGNIFICANT CHANGE UP (ref 13–17)
HGB BLD CALC-MCNC: 14.4 G/DL — SIGNIFICANT CHANGE UP (ref 13–17)
HGB BLD-MCNC: 12.7 G/DL — LOW (ref 13–17)
HGB BLD-MCNC: 13.8 G/DL — SIGNIFICANT CHANGE UP (ref 13–17)
HMPV RNA SPEC QL NAA+PROBE: SIGNIFICANT CHANGE UP
HPIV1 RNA SPEC QL NAA+PROBE: SIGNIFICANT CHANGE UP
HPIV2 RNA SPEC QL NAA+PROBE: SIGNIFICANT CHANGE UP
HPIV3 RNA SPEC QL NAA+PROBE: SIGNIFICANT CHANGE UP
HPIV4 RNA SPEC QL NAA+PROBE: SIGNIFICANT CHANGE UP
IANC: 10.47 K/UL — HIGH (ref 1.8–7.4)
IANC: 6.65 K/UL — SIGNIFICANT CHANGE UP (ref 1.8–7.4)
IMM GRANULOCYTES NFR BLD AUTO: 0.3 % — SIGNIFICANT CHANGE UP (ref 0–0.9)
IMM GRANULOCYTES NFR BLD AUTO: 0.4 % — SIGNIFICANT CHANGE UP (ref 0–0.9)
LACTATE BLDV-MCNC: 0.8 MMOL/L — SIGNIFICANT CHANGE UP (ref 0.5–2)
LACTATE BLDV-MCNC: 0.9 MMOL/L — SIGNIFICANT CHANGE UP (ref 0.5–2)
LACTATE BLDV-MCNC: 0.9 MMOL/L — SIGNIFICANT CHANGE UP (ref 0.5–2)
LACTATE BLDV-MCNC: 1 MMOL/L — SIGNIFICANT CHANGE UP (ref 0.5–2)
LACTATE BLDV-MCNC: 1 MMOL/L — SIGNIFICANT CHANGE UP (ref 0.5–2)
LYMPHOCYTES # BLD AUTO: 17 % — SIGNIFICANT CHANGE UP (ref 13–44)
LYMPHOCYTES # BLD AUTO: 2.28 K/UL — SIGNIFICANT CHANGE UP (ref 1–3.3)
LYMPHOCYTES # BLD AUTO: 2.59 K/UL — SIGNIFICANT CHANGE UP (ref 1–3.3)
LYMPHOCYTES # BLD AUTO: 26.2 % — SIGNIFICANT CHANGE UP (ref 13–44)
M PNEUMO DNA SPEC QL NAA+PROBE: SIGNIFICANT CHANGE UP
MAGNESIUM SERPL-MCNC: 1.8 MG/DL — SIGNIFICANT CHANGE UP (ref 1.6–2.6)
MAGNESIUM SERPL-MCNC: 1.9 MG/DL — SIGNIFICANT CHANGE UP (ref 1.6–2.6)
MAGNESIUM SERPL-MCNC: 1.9 MG/DL — SIGNIFICANT CHANGE UP (ref 1.6–2.6)
MAGNESIUM SERPL-MCNC: 2 MG/DL — SIGNIFICANT CHANGE UP (ref 1.6–2.6)
MAGNESIUM SERPL-MCNC: 2 MG/DL — SIGNIFICANT CHANGE UP (ref 1.6–2.6)
MCHC RBC-ENTMCNC: 28.9 PG — SIGNIFICANT CHANGE UP (ref 27–34)
MCHC RBC-ENTMCNC: 29 PG — SIGNIFICANT CHANGE UP (ref 27–34)
MCHC RBC-ENTMCNC: 34.9 GM/DL — SIGNIFICANT CHANGE UP (ref 32–36)
MCHC RBC-ENTMCNC: 35.6 GM/DL — SIGNIFICANT CHANGE UP (ref 32–36)
MCV RBC AUTO: 81.5 FL — SIGNIFICANT CHANGE UP (ref 80–100)
MCV RBC AUTO: 82.7 FL — SIGNIFICANT CHANGE UP (ref 80–100)
MONOCYTES # BLD AUTO: 0.46 K/UL — SIGNIFICANT CHANGE UP (ref 0–0.9)
MONOCYTES # BLD AUTO: 0.51 K/UL — SIGNIFICANT CHANGE UP (ref 0–0.9)
MONOCYTES NFR BLD AUTO: 3.8 % — SIGNIFICANT CHANGE UP (ref 2–14)
MONOCYTES NFR BLD AUTO: 4.7 % — SIGNIFICANT CHANGE UP (ref 2–14)
NEUTROPHILS # BLD AUTO: 10.47 K/UL — HIGH (ref 1.8–7.4)
NEUTROPHILS # BLD AUTO: 6.65 K/UL — SIGNIFICANT CHANGE UP (ref 1.8–7.4)
NEUTROPHILS NFR BLD AUTO: 67.3 % — SIGNIFICANT CHANGE UP (ref 43–77)
NEUTROPHILS NFR BLD AUTO: 78.1 % — HIGH (ref 43–77)
NRBC # BLD: 0 /100 WBCS — SIGNIFICANT CHANGE UP (ref 0–0)
NRBC # BLD: 0 /100 WBCS — SIGNIFICANT CHANGE UP (ref 0–0)
NRBC # FLD: 0 K/UL — SIGNIFICANT CHANGE UP (ref 0–0)
NRBC # FLD: 0 K/UL — SIGNIFICANT CHANGE UP (ref 0–0)
PCO2 BLDV: 34 MMHG — LOW (ref 42–55)
PCO2 BLDV: 36 MMHG — LOW (ref 42–55)
PCO2 BLDV: 37 MMHG — LOW (ref 42–55)
PCO2 BLDV: 39 MMHG — LOW (ref 42–55)
PCO2 BLDV: 40 MMHG — LOW (ref 42–55)
PCO2 BLDV: 41 MMHG — LOW (ref 42–55)
PH BLDV: 7.39 — SIGNIFICANT CHANGE UP (ref 7.32–7.43)
PH BLDV: 7.4 — SIGNIFICANT CHANGE UP (ref 7.32–7.43)
PH BLDV: 7.41 — SIGNIFICANT CHANGE UP (ref 7.32–7.43)
PH BLDV: 7.41 — SIGNIFICANT CHANGE UP (ref 7.32–7.43)
PH BLDV: 7.42 — SIGNIFICANT CHANGE UP (ref 7.32–7.43)
PH BLDV: 7.44 — HIGH (ref 7.32–7.43)
PHOSPHATE SERPL-MCNC: 2.4 MG/DL — LOW (ref 2.5–4.5)
PHOSPHATE SERPL-MCNC: 2.6 MG/DL — SIGNIFICANT CHANGE UP (ref 2.5–4.5)
PHOSPHATE SERPL-MCNC: 3 MG/DL — SIGNIFICANT CHANGE UP (ref 2.5–4.5)
PHOSPHATE SERPL-MCNC: 3.4 MG/DL — SIGNIFICANT CHANGE UP (ref 2.5–4.5)
PHOSPHATE SERPL-MCNC: 4.2 MG/DL — SIGNIFICANT CHANGE UP (ref 2.5–4.5)
PLATELET # BLD AUTO: 194 K/UL — SIGNIFICANT CHANGE UP (ref 150–400)
PLATELET # BLD AUTO: 201 K/UL — SIGNIFICANT CHANGE UP (ref 150–400)
PO2 BLDV: 31 MMHG — SIGNIFICANT CHANGE UP
PO2 BLDV: 38 MMHG — SIGNIFICANT CHANGE UP
PO2 BLDV: 48 MMHG — SIGNIFICANT CHANGE UP
PO2 BLDV: 49 MMHG — SIGNIFICANT CHANGE UP
PO2 BLDV: 56 MMHG — SIGNIFICANT CHANGE UP
PO2 BLDV: 62 MMHG — SIGNIFICANT CHANGE UP
POTASSIUM BLDV-SCNC: 3.4 MMOL/L — LOW (ref 3.5–5.1)
POTASSIUM BLDV-SCNC: 3.5 MMOL/L — SIGNIFICANT CHANGE UP (ref 3.5–5.1)
POTASSIUM BLDV-SCNC: 3.6 MMOL/L — SIGNIFICANT CHANGE UP (ref 3.5–5.1)
POTASSIUM BLDV-SCNC: 3.8 MMOL/L — SIGNIFICANT CHANGE UP (ref 3.5–5.1)
POTASSIUM BLDV-SCNC: 3.9 MMOL/L — SIGNIFICANT CHANGE UP (ref 3.5–5.1)
POTASSIUM SERPL-MCNC: 3.4 MMOL/L — LOW (ref 3.5–5.3)
POTASSIUM SERPL-MCNC: 3.6 MMOL/L — SIGNIFICANT CHANGE UP (ref 3.5–5.3)
POTASSIUM SERPL-MCNC: 3.7 MMOL/L — SIGNIFICANT CHANGE UP (ref 3.5–5.3)
POTASSIUM SERPL-MCNC: 3.9 MMOL/L — SIGNIFICANT CHANGE UP (ref 3.5–5.3)
POTASSIUM SERPL-MCNC: 4 MMOL/L — SIGNIFICANT CHANGE UP (ref 3.5–5.3)
POTASSIUM SERPL-SCNC: 3.4 MMOL/L — LOW (ref 3.5–5.3)
POTASSIUM SERPL-SCNC: 3.6 MMOL/L — SIGNIFICANT CHANGE UP (ref 3.5–5.3)
POTASSIUM SERPL-SCNC: 3.7 MMOL/L — SIGNIFICANT CHANGE UP (ref 3.5–5.3)
POTASSIUM SERPL-SCNC: 3.9 MMOL/L — SIGNIFICANT CHANGE UP (ref 3.5–5.3)
POTASSIUM SERPL-SCNC: 4 MMOL/L — SIGNIFICANT CHANGE UP (ref 3.5–5.3)
PROT SERPL-MCNC: 6.7 G/DL — SIGNIFICANT CHANGE UP (ref 6–8.3)
PROT SERPL-MCNC: 6.8 G/DL — SIGNIFICANT CHANGE UP (ref 6–8.3)
PROT SERPL-MCNC: 7 G/DL — SIGNIFICANT CHANGE UP (ref 6–8.3)
PROT SERPL-MCNC: 7 G/DL — SIGNIFICANT CHANGE UP (ref 6–8.3)
PROT SERPL-MCNC: 7.4 G/DL — SIGNIFICANT CHANGE UP (ref 6–8.3)
RAPID RVP RESULT: SIGNIFICANT CHANGE UP
RBC # BLD: 4.4 M/UL — SIGNIFICANT CHANGE UP (ref 4.2–5.8)
RBC # BLD: 4.76 M/UL — SIGNIFICANT CHANGE UP (ref 4.2–5.8)
RBC # FLD: 12.8 % — SIGNIFICANT CHANGE UP (ref 10.3–14.5)
RBC # FLD: 13.1 % — SIGNIFICANT CHANGE UP (ref 10.3–14.5)
RSV RNA SPEC QL NAA+PROBE: SIGNIFICANT CHANGE UP
RV+EV RNA SPEC QL NAA+PROBE: SIGNIFICANT CHANGE UP
SAO2 % BLDV: 56.2 % — SIGNIFICANT CHANGE UP
SAO2 % BLDV: 71.3 % — SIGNIFICANT CHANGE UP
SAO2 % BLDV: 86.3 % — SIGNIFICANT CHANGE UP
SAO2 % BLDV: 86.3 % — SIGNIFICANT CHANGE UP
SAO2 % BLDV: 89.5 % — SIGNIFICANT CHANGE UP
SAO2 % BLDV: 92.1 % — SIGNIFICANT CHANGE UP
SARS-COV-2 RNA SPEC QL NAA+PROBE: SIGNIFICANT CHANGE UP
SODIUM SERPL-SCNC: 129 MMOL/L — LOW (ref 135–145)
SODIUM SERPL-SCNC: 131 MMOL/L — LOW (ref 135–145)
SODIUM SERPL-SCNC: 133 MMOL/L — LOW (ref 135–145)
SODIUM SERPL-SCNC: 135 MMOL/L — SIGNIFICANT CHANGE UP (ref 135–145)
SODIUM SERPL-SCNC: 136 MMOL/L — SIGNIFICANT CHANGE UP (ref 135–145)
SPECIMEN SOURCE: SIGNIFICANT CHANGE UP
TRIGL SERPL-MCNC: 490 MG/DL — HIGH
TSH SERPL-MCNC: 0.7 UIU/ML — SIGNIFICANT CHANGE UP (ref 0.27–4.2)
WBC # BLD: 13.42 K/UL — HIGH (ref 3.8–10.5)
WBC # BLD: 9.88 K/UL — SIGNIFICANT CHANGE UP (ref 3.8–10.5)
WBC # FLD AUTO: 13.42 K/UL — HIGH (ref 3.8–10.5)
WBC # FLD AUTO: 9.88 K/UL — SIGNIFICANT CHANGE UP (ref 3.8–10.5)

## 2022-10-20 PROCEDURE — 93306 TTE W/DOPPLER COMPLETE: CPT | Mod: 26

## 2022-10-20 PROCEDURE — 99233 SBSQ HOSP IP/OBS HIGH 50: CPT | Mod: GC

## 2022-10-20 PROCEDURE — 99291 CRITICAL CARE FIRST HOUR: CPT | Mod: GC

## 2022-10-20 RX ORDER — INSULIN HUMAN 100 [IU]/ML
4 INJECTION, SOLUTION SUBCUTANEOUS
Qty: 100 | Refills: 0 | Status: DISCONTINUED | OUTPATIENT
Start: 2022-10-20 | End: 2022-10-20

## 2022-10-20 RX ORDER — SODIUM CHLORIDE 9 MG/ML
1000 INJECTION, SOLUTION INTRAVENOUS
Refills: 0 | Status: DISCONTINUED | OUTPATIENT
Start: 2022-10-20 | End: 2022-10-20

## 2022-10-20 RX ORDER — INSULIN HUMAN 100 [IU]/ML
2 INJECTION, SOLUTION SUBCUTANEOUS
Qty: 100 | Refills: 0 | Status: DISCONTINUED | OUTPATIENT
Start: 2022-10-20 | End: 2022-10-20

## 2022-10-20 RX ORDER — CALCIUM GLUCONATE 100 MG/ML
2 VIAL (ML) INTRAVENOUS ONCE
Refills: 0 | Status: COMPLETED | OUTPATIENT
Start: 2022-10-20 | End: 2022-10-20

## 2022-10-20 RX ORDER — POTASSIUM PHOSPHATE, MONOBASIC POTASSIUM PHOSPHATE, DIBASIC 236; 224 MG/ML; MG/ML
15 INJECTION, SOLUTION INTRAVENOUS ONCE
Refills: 0 | Status: COMPLETED | OUTPATIENT
Start: 2022-10-20 | End: 2022-10-20

## 2022-10-20 RX ORDER — SODIUM CHLORIDE 9 MG/ML
1000 INJECTION, SOLUTION INTRAVENOUS
Refills: 0 | Status: DISCONTINUED | OUTPATIENT
Start: 2022-10-20 | End: 2022-10-21

## 2022-10-20 RX ORDER — ACETAMINOPHEN 500 MG
650 TABLET ORAL ONCE
Refills: 0 | Status: COMPLETED | OUTPATIENT
Start: 2022-10-20 | End: 2022-10-20

## 2022-10-20 RX ORDER — POTASSIUM CHLORIDE 20 MEQ
10 PACKET (EA) ORAL
Refills: 0 | Status: COMPLETED | OUTPATIENT
Start: 2022-10-20 | End: 2022-10-20

## 2022-10-20 RX ORDER — POTASSIUM CHLORIDE 20 MEQ
20 PACKET (EA) ORAL ONCE
Refills: 0 | Status: COMPLETED | OUTPATIENT
Start: 2022-10-20 | End: 2022-10-20

## 2022-10-20 RX ORDER — POTASSIUM CHLORIDE 20 MEQ
40 PACKET (EA) ORAL ONCE
Refills: 0 | Status: COMPLETED | OUTPATIENT
Start: 2022-10-20 | End: 2022-10-20

## 2022-10-20 RX ORDER — INSULIN HUMAN 100 [IU]/ML
5 INJECTION, SOLUTION SUBCUTANEOUS
Qty: 100 | Refills: 0 | Status: DISCONTINUED | OUTPATIENT
Start: 2022-10-20 | End: 2022-10-20

## 2022-10-20 RX ORDER — INSULIN GLARGINE 100 [IU]/ML
18 INJECTION, SOLUTION SUBCUTANEOUS ONCE
Refills: 0 | Status: COMPLETED | OUTPATIENT
Start: 2022-10-20 | End: 2022-10-20

## 2022-10-20 RX ADMIN — SODIUM CHLORIDE 60 MILLILITER(S): 9 INJECTION, SOLUTION INTRAVENOUS at 02:22

## 2022-10-20 RX ADMIN — INSULIN HUMAN 5 UNIT(S)/HR: 100 INJECTION, SOLUTION SUBCUTANEOUS at 06:21

## 2022-10-20 RX ADMIN — Medication 100 MILLIEQUIVALENT(S): at 14:12

## 2022-10-20 RX ADMIN — SODIUM CHLORIDE 80 MILLILITER(S): 9 INJECTION, SOLUTION INTRAVENOUS at 04:46

## 2022-10-20 RX ADMIN — Medication 4 GRAM(S): at 12:15

## 2022-10-20 RX ADMIN — INSULIN HUMAN 2 UNIT(S)/HR: 100 INJECTION, SOLUTION SUBCUTANEOUS at 07:15

## 2022-10-20 RX ADMIN — Medication 650 MILLIGRAM(S): at 06:00

## 2022-10-20 RX ADMIN — INSULIN HUMAN 5 UNIT(S)/HR: 100 INJECTION, SOLUTION SUBCUTANEOUS at 00:31

## 2022-10-20 RX ADMIN — ATORVASTATIN CALCIUM 40 MILLIGRAM(S): 80 TABLET, FILM COATED ORAL at 21:15

## 2022-10-20 RX ADMIN — SODIUM CHLORIDE 75 MILLILITER(S): 9 INJECTION, SOLUTION INTRAVENOUS at 04:46

## 2022-10-20 RX ADMIN — Medication 100 MILLIEQUIVALENT(S): at 15:26

## 2022-10-20 RX ADMIN — Medication 20 MILLIEQUIVALENT(S): at 13:05

## 2022-10-20 RX ADMIN — INSULIN HUMAN 4 UNIT(S)/HR: 100 INJECTION, SOLUTION SUBCUTANEOUS at 02:33

## 2022-10-20 RX ADMIN — Medication 650 MILLIGRAM(S): at 07:00

## 2022-10-20 RX ADMIN — POTASSIUM PHOSPHATE, MONOBASIC POTASSIUM PHOSPHATE, DIBASIC 62.5 MILLIMOLE(S): 236; 224 INJECTION, SOLUTION INTRAVENOUS at 21:15

## 2022-10-20 RX ADMIN — Medication 100 MILLIEQUIVALENT(S): at 13:05

## 2022-10-20 RX ADMIN — CHLORHEXIDINE GLUCONATE 1 APPLICATION(S): 213 SOLUTION TOPICAL at 06:18

## 2022-10-20 RX ADMIN — Medication 200 GRAM(S): at 02:57

## 2022-10-20 RX ADMIN — Medication 40 MILLIEQUIVALENT(S): at 13:05

## 2022-10-20 RX ADMIN — SODIUM CHLORIDE 100 MILLILITER(S): 9 INJECTION, SOLUTION INTRAVENOUS at 23:37

## 2022-10-20 RX ADMIN — ENOXAPARIN SODIUM 40 MILLIGRAM(S): 100 INJECTION SUBCUTANEOUS at 17:16

## 2022-10-20 RX ADMIN — INSULIN GLARGINE 18 UNIT(S): 100 INJECTION, SOLUTION SUBCUTANEOUS at 21:22

## 2022-10-20 RX ADMIN — Medication 145 MILLIGRAM(S): at 12:15

## 2022-10-20 NOTE — DIETITIAN INITIAL EVALUATION ADULT - ADD RECOMMEND
1. Full diet education to follow. 2. Initiate DASH/TLC, Consistent Carbohydrate therapeutic diet when medically appropriate. 3. Monitor weights, labs, BM's, skin integrity, p.o. intake. 4. Follow pt as per protocol.

## 2022-10-20 NOTE — PROGRESS NOTE ADULT - SUBJECTIVE AND OBJECTIVE BOX
Chief Complaint: Acute pancreatitis 2/2 hypertriglyceridemia, DKA    History: Feeling well. No abd pain, N/V. Ready to eat. No TG level drawn today. DKA resolved. Still on insulin drip.    MEDICATIONS  (STANDING):  atorvastatin 40 milliGRAM(s) Oral at bedtime  chlorhexidine 2% Cloths 1 Application(s) Topical <User Schedule>  coronavirus bivalent (EUA) Booster Vaccine (PFIZER) 0.3 milliLiter(s) IntraMuscular once  dextrose 5%. 1000 milliLiter(s) (50 mL/Hr) IV Continuous <Continuous>  dextrose 5%. 1000 milliLiter(s) (100 mL/Hr) IV Continuous <Continuous>  dextrose 5%. 1000 milliLiter(s) (80 mL/Hr) IV Continuous <Continuous>  dextrose 50% Injectable 25 Gram(s) IV Push once  dextrose 50% Injectable 12.5 Gram(s) IV Push once  dextrose 50% Injectable 25 Gram(s) IV Push once  enoxaparin Injectable 40 milliGRAM(s) SubCutaneous every 24 hours  fenofibrate Tablet 145 milliGRAM(s) Oral daily  glucagon  Injectable 1 milliGRAM(s) IntraMuscular once  influenza   Vaccine 0.5 milliLiter(s) IntraMuscular once  insulin regular Infusion 2 Unit(s)/Hr (2 mL/Hr) IV Continuous <Continuous>  lactated ringers. 1000 milliLiter(s) (75 mL/Hr) IV Continuous <Continuous>  lisinopril 10 milliGRAM(s) Oral daily  omega-3-Acid Ethyl Esters 4 Gram(s) Oral daily    MEDICATIONS  (PRN):  acetaminophen     Tablet .. 650 milliGRAM(s) Oral every 6 hours PRN Mild Pain (1 - 3)  dextrose Oral Gel 15 Gram(s) Oral once PRN Blood Glucose LESS THAN 70 milliGRAM(s)/deciliter  morphine  - Injectable 2 milliGRAM(s) IV Push every 4 hours PRN Moderate Pain (4 - 6)      PHYSICAL EXAM:  VITALS: T(C): 36.9 (10-20-22 @ 12:00)  T(F): 98.5 (10-20-22 @ 12:00), Max: 100.5 (10-19-22 @ 19:30)  HR: 88 (10-20-22 @ 16:00) (78 - 118)  BP: 118/70 (10-20-22 @ 16:00) (105/75 - 152/97)  RR:  (12 - 25)  SpO2:  (94% - 100%)  Wt(kg): --  General: Well-developed male, No acute distress, Speaking full sentences.   Eye:  Extraocular movements are intact, No proptosis or lid lag, No scleral icterus.   Respiratory:  Respirations are non-labored, Symmetric chest wall expansion.  Cardiovascular:  Normal rate, Regular rhythm, No edema.  Gastrointestinal:  Soft, Non-tender, Non-distended.   Integumentary:  Warm, dry.    POCT Blood Glucose.: 153 mg/dL (10-20-22 @ 16:09)  POCT Blood Glucose.: 153 mg/dL (10-20-22 @ 15:35)  POCT Blood Glucose.: 149 mg/dL (10-20-22 @ 14:07)  POCT Blood Glucose.: 150 mg/dL (10-20-22 @ 13:08)  POCT Blood Glucose.: 155 mg/dL (10-20-22 @ 12:14)  POCT Blood Glucose.: 164 mg/dL (10-20-22 @ 11:25)  POCT Blood Glucose.: 150 mg/dL (10-20-22 @ 10:27)  POCT Blood Glucose.: 155 mg/dL (10-20-22 @ 08:52)  POCT Blood Glucose.: 122 mg/dL (10-20-22 @ 07:45)  POCT Blood Glucose.: 114 mg/dL (10-20-22 @ 06:55)  POCT Blood Glucose.: 139 mg/dL (10-20-22 @ 06:15)  POCT Blood Glucose.: 115 mg/dL (10-20-22 @ 05:10)  POCT Blood Glucose.: 95 mg/dL (10-20-22 @ 04:19)  POCT Blood Glucose.: 113 mg/dL (10-20-22 @ 03:03)  POCT Blood Glucose.: 91 mg/dL (10-20-22 @ 02:09)  POCT Blood Glucose.: 104 mg/dL (10-20-22 @ 01:11)  POCT Blood Glucose.: 106 mg/dL (10-19-22 @ 23:57)  POCT Blood Glucose.: 125 mg/dL (10-19-22 @ 23:16)  POCT Blood Glucose.: 120 mg/dL (10-19-22 @ 22:18)  POCT Blood Glucose.: 155 mg/dL (10-19-22 @ 21:20)  POCT Blood Glucose.: 128 mg/dL (10-19-22 @ 20:41)  POCT Blood Glucose.: 146 mg/dL (10-19-22 @ 19:51)  POCT Blood Glucose.: 180 mg/dL (10-19-22 @ 18:20)  POCT Blood Glucose.: 156 mg/dL (10-19-22 @ 17:17)  POCT Blood Glucose.: 153 mg/dL (10-19-22 @ 16:16)  POCT Blood Glucose.: 233 mg/dL (10-19-22 @ 14:58)  POCT Blood Glucose.: 191 mg/dL (10-19-22 @ 13:51)  POCT Blood Glucose.: 207 mg/dL (10-19-22 @ 13:00)  POCT Blood Glucose.: 216 mg/dL (10-19-22 @ 12:09)  POCT Blood Glucose.: 220 mg/dL (10-19-22 @ 11:34)  POCT Blood Glucose.: 278 mg/dL (10-19-22 @ 10:12)  POCT Blood Glucose.: 348 mg/dL (10-19-22 @ 07:33)      10-20    131<L>  |  98  |  5<L>  ----------------------------<  163<H>  3.4<L>   |  22  |  0.61    eGFR: 125    Ca    9.4      10-20  Mg     1.90     10-20  Phos  3.0     10-20    TPro  7.0  /  Alb  3.6  /  TBili  0.6  /  DBili  x   /  AST  19  /  ALT  20  /  AlkPhos  74  10-20          Thyroid Function Tests:  10-20 @ 02:15 TSH 0.70 FreeT4 -- T3 -- Anti TPO -- Anti Thyroglobulin Ab -- TSI --

## 2022-10-20 NOTE — DIETITIAN INITIAL EVALUATION ADULT - PERTINENT MEDS FT
MEDICATIONS  (STANDING):  atorvastatin 40 milliGRAM(s) Oral at bedtime  chlorhexidine 2% Cloths 1 Application(s) Topical <User Schedule>  coronavirus bivalent (EUA) Booster Vaccine (PFIZER) 0.3 milliLiter(s) IntraMuscular once  dextrose 5%. 1000 milliLiter(s) (100 mL/Hr) IV Continuous <Continuous>  dextrose 5%. 1000 milliLiter(s) (50 mL/Hr) IV Continuous <Continuous>  dextrose 5%. 1000 milliLiter(s) (80 mL/Hr) IV Continuous <Continuous>  dextrose 50% Injectable 25 Gram(s) IV Push once  dextrose 50% Injectable 12.5 Gram(s) IV Push once  dextrose 50% Injectable 25 Gram(s) IV Push once  enoxaparin Injectable 40 milliGRAM(s) SubCutaneous every 24 hours  fenofibrate Tablet 145 milliGRAM(s) Oral daily  glucagon  Injectable 1 milliGRAM(s) IntraMuscular once  influenza   Vaccine 0.5 milliLiter(s) IntraMuscular once  insulin regular Infusion 2 Unit(s)/Hr (2 mL/Hr) IV Continuous <Continuous>  lactated ringers. 1000 milliLiter(s) (75 mL/Hr) IV Continuous <Continuous>  lisinopril 10 milliGRAM(s) Oral daily  omega-3-Acid Ethyl Esters 4 Gram(s) Oral daily    MEDICATIONS  (PRN):  acetaminophen     Tablet .. 650 milliGRAM(s) Oral every 6 hours PRN Mild Pain (1 - 3)  dextrose Oral Gel 15 Gram(s) Oral once PRN Blood Glucose LESS THAN 70 milliGRAM(s)/deciliter  morphine  - Injectable 2 milliGRAM(s) IV Push every 4 hours PRN Moderate Pain (4 - 6)

## 2022-10-20 NOTE — DIETITIAN INITIAL EVALUATION ADULT - NS FNS DIET ORDER
Diet, NPO:   Except Medications     Special Instructions for Nursing:  Except Medications (10-19-22 @ 12:47)

## 2022-10-20 NOTE — PROGRESS NOTE ADULT - ASSESSMENT
Patient is a 42-year-old male w/ pmh of HTN (diet-controlled) presenting w/ abdominal pain associated w/ N/V in the context of  polydipsia, polyuria and unintentional weight loss. Found to have mild DKA in the setting of acute hypertriglyceridemia-induced acute pancreatitis. Consideration for underlying malignancy or autoimmune pathology cannot be excluded.     PLAN:    NEURO    #Pain   - A/Ox4 at baseline  - Neuro checks q4  - Pain: s/p 4mg morphine IVPx1 in ED  ---- Mild pain: Acetaminophen 650mg PO   ---- Moderate pain: Morphine 2mg q6 hours IV   ---- Severe pain: Morphine 4mg q6 hours IV     CARDIOVASCULAR    #HTN   - Home meds: None   - Would start ACEi/ARB if diabetic   ---- Lisinopril 10mg PO daily    #ASCVD Risk:   - Patient endorses long-standing history of hypertension, managed with diet only  - Lipid Profile: Total Cholesterol 408, Triglycerides 2025, HDL 18 mg/dL, Non HDL cholesterol 390   - f/u Lipoprotein A and Apoliprotein B       PULMONARY  - No active issues  - CTM respiratory status while on fluids       GASTROINTESTINAL    #Acute pancreatitis   - 2/2 to triglycerides   - Abdominal pain + Lipase  - CT a/p pending to assess for fluid collection, necrosis or abscess   - NPO except medications   - D5/LR 100cc/hr  - BISAP score 0, Ritesh: 1  - Total Ca 8.9, ionized 1.08  ----- supplement calcium gluconate 2g       ENDOCRINE    #Hypertriglyceridemia   - Unclear etiology   - 10/19: Triglycerides 2339 on admission  - Trend triglycerides q12h   - Insulin ggt at 5u/hour reduced dose from 0.1u/kg   - Fenofibrate 145mg PO daily  - Lovaza 4mg PO daily  - Atorvastatin 40mg PO daily  - Endocrinology consulted, appreciate recs  - f/u Zinc transporter 8 ab  - Will require outpatient follow with Dr. Calderon (Lipidologist)     #Hyperlipidemia  - Lipid Profile: Total Cholesterol 408, Triglycerides 2025, HDL 18 mg/dL, Non HDL cholesterol 390   - f/u Lipoprotein A and Apoliprotein B       #DKA  - Mild DKA      - Ph: 7.24, Bicarb 22, , + Ketones in serum and urine   - 10/19: In ED: 5 units of insulin, 2L of fluid 1 NS and 1LR  - UA: Ketones and glucose +, BHB 4.4   - a1c 12%  - Insulin gtt 6cc/hr target glucose 150-200  - Endocrinology consulted   - f/u Glutamic acid decarboxylase abs, zinc transporter 8 abs, islet cell ab, and IA-2 ab (to r/o T1DM/RYAN)  - f/u C-peptide and CK    INFECTIOUS DISEASE    - Afebrile, denies s/s of infection  - UA w/o evidence of infection   - CXR w/o evidence of infection  - No active issues.    HEMATOLOGY/ONCOLOGY    - No active issues  - Follow CT a/p     DVT/PE ppx: Lovenox 40mg     ETHICS: Full code        Patient is a 42-year-old male w/ pmh of HTN (diet-controlled) presenting w/ abdominal pain associated w/ N/V in the context of  polydipsia, polyuria and unintentional weight loss. Found to have mild DKA in the setting of acute hypertriglyceridemia-induced acute pancreatitis. Consideration for underlying malignancy or autoimmune pathology cannot be excluded.     PLAN:    NEURO    #Pain   - A/Ox4 at baseline  - Neuro checks q4  - Pain: s/p 4mg morphine IVPx1 in ED  ---- Mild pain: Acetaminophen 650mg PO   ---- Moderate pain: Morphine 2mg q6 hours IV   ---- Severe pain: Morphine 4mg q6 hours IV     CARDIOVASCULAR    #HTN   - Home meds: None   - Would start ACEi/ARB if diabetic   ---- Lisinopril 10mg PO daily    #ASCVD Risk:   - Patient endorses long-standing history of hypertension, managed with diet only  - Lipid Profile: Total Cholesterol 408, Triglycerides 2025, HDL 18 mg/dL, Non HDL cholesterol 390   - f/u Lipoprotein A and Apoliprotein B       PULMONARY  - No active issues  - CTM respiratory status while on fluids       GASTROINTESTINAL    #Acute pancreatitis   - 2/2 to triglycerides   - Abdominal pain + Lipase  - CT a/p 10/19 no evidence of necrosis, abscess, or fluid collection   - NPO except medications   - D5/LR 100cc/hr  - 10/20 lactate normalized, now 1  - BISAP score 0, Ritesh: 1  - Total Ca 8.9, ionized 1.08  ----- supplement calcium gluconate 2g       ENDOCRINE    #Hypertriglyceridemia   - Unclear etiology   - 10/19: Triglycerides 2339 on admission  - Trend triglycerides q12h - last 812 (10/19 evening)  Blood Gas Venous - Lactate: 1.0 mmol/L (10.20.22 @ 10:15)  - Insulin ggt at 5u/hour reduced dose from 0.1u/kg   - Fenofibrate 145mg PO daily  - Lovaza 4mg PO daily  - Atorvastatin 40mg PO daily  - Endocrinology consulted, appreciate recs  - f/u Zinc transporter 8 ab  - Will require outpatient follow with Dr. Calderon (Lipidologist)     #Hyperlipidemia  - Lipid Profile: Total Cholesterol 408, Triglycerides 2025, HDL 18 mg/dL, Non HDL cholesterol 390   - f/u Lipoprotein A and Apoliprotein B       #DKA  - Mild DKA      - Ph: 7.24, Bicarb 22, , + Ketones in serum and urine   - 10/19: In ED: 5 units of insulin, 2L of fluid 1 NS and 1LR  - UA: Ketones and glucose +, BHB 4.4   - a1c 12%  - Endocrinology on board  - 10/20 VBG pH 7.42, bicarb 24, AG 11  - BHB 0.7 10/20  > Insulin gtt 6cc/hr target glucose 150-200  - f/u Glutamic acid decarboxylase abs, zinc transporter 8 abs, islet cell ab, and IA-2 ab (to r/o T1DM/RYAN)  - f/u C-peptide and CK    INFECTIOUS DISEASE    - Afebrile, denies s/s of infection  - UA w/o evidence of infection   - CXR w/o evidence of infection  - No active issues.    HEMATOLOGY/ONCOLOGY    - No active issues  - No evidence of mass/necrosis/fluid on CT A/P    DVT/PE ppx: Lovenox 40mg     ETHICS: Full code        Patient is a 42-year-old male w/ pmh of HTN (diet-controlled) presenting w/ abdominal pain associated w/ N/V in the context of  polydipsia, polyuria and unintentional weight loss. Found to have mild DKA in the setting of acute hypertriglyceridemia-induced acute pancreatitis. Consideration for underlying malignancy or autoimmune pathology cannot be excluded.     PLAN:    NEURO    #Pain   - A/Ox4 at baseline  - Neuro checks q4  - morphine 4 mg IV q4 PRN    CARDIOVASCULAR    #HTN   - Home meds: None   ---- Lisinopril 10mg PO daily    #ASCVD Risk:   - Patient endorses long-standing history of hypertension, managed with diet only  - Lipid Profile: Total Cholesterol 408, Triglycerides 2025, HDL 18 mg/dL, Non HDL cholesterol 390   - f/u Lipoprotein A and Apoliprotein B   - f/u TTE      PULMONARY  - No active issues      GASTROINTESTINAL    #Acute pancreatitis   - 2/2 to triglycerides   - Abdominal pain + Lipase  - CT a/p 10/19 no evidence of necrosis, abscess, or fluid collection   - NPO except medications   - D5 @ 80 cc/hr   - LR @ 75 cc/hr  - BISAP score 0, Ritesh: 1  - ionized calcium - 1.08    ENDOCRINE    #Hypertriglyceridemia   - Unclear etiology   - 10/19: Triglycerides 2339 on admission  - Trend triglycerides q12h   - goal triglycerides < 500, will switch to basal  - Insulin ggt at 2u/hour  - Fenofibrate 145mg PO daily  - Lovaza 4mg PO daily  - Atorvastatin 40mg PO daily  - Endocrinology consulted, appreciate recs  - f/u Zinc transporter 8 ab  - Will require outpatient follow with Dr. Calderon (Lipidologist)     #Hyperlipidemia  - Lipid Profile: Total Cholesterol 408, Triglycerides 2025, HDL 18 mg/dL, Non HDL cholesterol 390   - TG - 812 (10/20/22)  - f/u Lipoprotein A and Apoliprotein B       #DKA  - Mild DKA      - Ph: 7.24, Bicarb 22, , + Ketones in serum and urine   - 10/19: In ED: 5 units of insulin, 2L of fluid 1 NS and 1LR  - UA: Ketones and glucose +, BHB 4.4   - a1c 12%  - Insulin gtt target glucose 150-200  - Endocrinology consulted   - f/u Glutamic acid decarboxylase abs, zinc transporter 8 abs, islet cell ab, and IA-2 ab (to r/o T1DM/RYAN)  - f/u C-peptide and CK    INFECTIOUS DISEASE    - Afebrile, denies s/s of infection  - UA w/o evidence of infection   - CXR w/o evidence of infection  - febrile 10/19, f/u Bcx  - No active issues.    HEMATOLOGY/ONCOLOGY    - No active issues  - Follow CT a/p     DVT/PE ppx: Lovenox 40mg     ETHICS: Full code    Patient is a 42-year-old male w/ pmh of HTN (diet-controlled) presenting w/ abdominal pain associated w/ N/V in the context of  polydipsia, polyuria and unintentional weight loss. Found to have mild DKA in the setting of acute hypertriglyceridemia-induced acute pancreatitis. Consideration for underlying malignancy or autoimmune pathology cannot be excluded.     PLAN:    NEURO    #Pain   - A/Ox4 at baseline  - Neuro checks q4  - morphine 4 mg IV q4 PRN    CARDIOVASCULAR    #HTN   - Home meds: None   ---- Lisinopril 10mg PO daily    #ASCVD Risk:   - Patient endorses long-standing history of hypertension, managed with diet only  - Lipid Profile: Total Cholesterol 408, Triglycerides 2025, HDL 18 mg/dL, Non HDL cholesterol 390   - f/u Lipoprotein A and Apoliprotein B   - f/u TTE      PULMONARY  - No active issues      GASTROINTESTINAL    #Acute pancreatitis   - 2/2 to triglycerides   - Abdominal pain + Lipase  - CT a/p 10/19 no evidence of necrosis, abscess, or fluid collection   - NPO except medications   - D5 @ 80 cc/hr   - LR @ 75 cc/hr  - BISAP score 0, Ritesh: 1  - ionized calcium - 1.08    ENDOCRINE    #Hypertriglyceridemia   - Unclear etiology   - 10/19: Triglycerides 2339 on admission  - Trend triglycerides q12h   - goal triglycerides < 500, will switch to basal  - Insulin ggt at 2u/hour  - Fenofibrate 145mg PO daily  - Lovaza 4mg PO daily  - Atorvastatin 40mg PO daily  - Endocrinology consulted, appreciate recs  - f/u Zinc transporter 8 ab  - Will require outpatient follow with Dr. Calderon (Lipidologist)     #Hyperlipidemia  - Lipid Profile: Total Cholesterol 408, Triglycerides 2025, HDL 18 mg/dL, Non HDL cholesterol 390   - TG - 812 (10/20/22)  - Apolipoprotein A1 69 (low)  - Apoliprotein B 82 wnl  - APO A1/B ratio 1.19 wnl  - TSH 0.70 wnl      #DKA  - Mild DKA      - Ph: 7.24, Bicarb 22, , + Ketones in serum and urine   - 10/19: In ED: 5 units of insulin, 2L of fluid 1 NS and 1LR  - UA: Ketones and glucose +, BHB 4.4   - a1c 12%  - Insulin gtt target glucose 150-200  - Endocrinology consulted   - f/u Glutamic acid decarboxylase abs, zinc transporter 8 abs, islet cell ab, and IA-2 ab (to r/o T1DM/RYAN)  - C-peptide 1.2 wnl  - CK 76 wnl    INFECTIOUS DISEASE    - Afebrile, denies s/s of infection  - UA w/o evidence of infection   - CXR w/o evidence of infection  - febrile 10/19, f/u Bcx  - RVP negative  - COVID negative  - No active issues.    HEMATOLOGY/ONCOLOGY    - No active issues  - Follow CT a/p     DVT/PE ppx: Lovenox 40mg     ETHICS: Full code    Patient is a 42-year-old male w/ pmh of HTN (diet-controlled) presenting w/ abdominal pain associated w/ N/V in the context of  polydipsia, polyuria and unintentional weight loss. Found to have mild DKA in the setting of acute hypertriglyceridemia-induced acute pancreatitis. Consideration for underlying malignancy or autoimmune pathology cannot be excluded.     PLAN:    NEURO    #Pain   - A/Ox4 at baseline  - Neuro checks q4  - morphine 4 mg IV q4 PRN    CARDIOVASCULAR    #HTN   - Home meds: None   ---- Lisinopril 10mg PO daily    #ASCVD Risk:   - Patient endorses long-standing history of hypertension, managed with diet only  - Lipid Profile: Total Cholesterol 408, Triglycerides 2025, HDL 18 mg/dL, Non HDL cholesterol 390   - f/u Lipoprotein A and Apoliprotein B   - f/u TTE      PULMONARY  - No active issues      GASTROINTESTINAL    #Acute pancreatitis   - 2/2 to triglycerides   - Abdominal pain + Lipase  - CT a/p 10/19 no evidence of necrosis, abscess, or fluid collection   - NPO except medications   - D5 @ 80 cc/hr   - LR @ 75 cc/hr  - BISAP score 0, Ritesh: 1  - ionized calcium - 1.08    ENDOCRINE    #Hypertriglyceridemia   - Unclear etiology   - 10/19: Triglycerides 2339 on admission  - Trend triglycerides q12h   - goal triglycerides < 500, will switch to basal  - Insulin ggt at 2u/hour  - Fenofibrate 145mg PO daily  - Lovaza 4mg PO daily  - Atorvastatin 40mg PO daily  - Endocrinology consulted, appreciate recs  - f/u Zinc transporter 8 ab  - Will require outpatient follow with Dr. Calderon (Lipidologist)     #Hyperlipidemia  - Lipid Profile: Total Cholesterol 408, Triglycerides 2025, HDL 18 mg/dL, Non HDL cholesterol 390   - TG - 812 (10/20/22)  - Apolipoprotein A1 69 (low)  - Apoliprotein B 82 wnl  - APO A1/B ratio 1.19 wnl  - TSH 0.70 wnl      #DKA  - Mild DKA      - Ph: 7.24, Bicarb 22, , + Ketones in serum and urine   - 10/19: In ED: 5 units of insulin, 2L of fluid 1 NS and 1LR  - UA: Ketones and glucose +, BHB 4.4   - a1c 12%  - Insulin gtt target glucose 150-200  - Endocrinology consulted   - 10/20 DKA now resolved as AG <14 & bicarb > 18 on 2 consecutive checks  - As TG still >500, c/w insulin gtt but at lower rate (2 units/hr) to avoid high dextrose requirements    - If FS <200, can start dextrose at lower rate 25-50cc/hr to prevent worsening hypertriglyceridemia  - C-peptide 1.2 wnl  - CK 76 wnl  - f/u Glutamic acid decarboxylase abs, zinc transporter 8 abs, islet cell ab, and IA-2 ab (to r/o T1DM/RYAN)    INFECTIOUS DISEASE    - Afebrile, denies s/s of infection  - UA w/o evidence of infection   - CXR w/o evidence of infection  - febrile 10/19, f/u Bcx  - RVP negative  - COVID negative  - No active issues.    HEMATOLOGY/ONCOLOGY    - No active issues  - Follow CT a/p     DVT/PE ppx: Lovenox 40mg     ETHICS: Full code    Patient is a 42-year-old male w/ pmh of HTN (diet-controlled) presenting w/ abdominal pain associated w/ N/V in the context of  polydipsia, polyuria and unintentional weight loss. Found to have mild DKA in the setting of acute hypertriglyceridemia-induced acute pancreatitis. Consideration for underlying malignancy or autoimmune pathology cannot be excluded.     PLAN:    NEURO    #Pain   - A/Ox4 at baseline  - Neuro checks q4  - morphine 4 mg IV q4 PRN    CARDIOVASCULAR    #HTN   - Home meds: None   ---- Lisinopril 10mg PO daily    #ASCVD Risk:   - Patient endorses long-standing history of hypertension, managed with diet only  - Lipid Profile: Total Cholesterol 408, Triglycerides 2025, HDL 18 mg/dL, Non HDL cholesterol 390   - f/u Lipoprotein A and Apoliprotein B   - f/u TTE      PULMONARY  - No active issues      GASTROINTESTINAL    #Acute pancreatitis   - 2/2 to triglycerides   - Abdominal pain + Lipase  - CT a/p 10/19 no evidence of necrosis, abscess, or fluid collection   - NPO except medications   - D5 @ 80 cc/hr   - LR @ 75 cc/hr  - BISAP score 0, Ritesh: 1  - ionized calcium - 1.08    ENDOCRINE    #Hypertriglyceridemia   - Unclear etiology   - 10/19: Triglycerides 2339 on admission  - Trend triglycerides q12h   - goal triglycerides < 500, will switch to basal  - Insulin ggt at 2u/hour  - Fenofibrate 145mg PO daily  - Lovaza 4mg PO daily  - Atorvastatin 40mg PO daily  - Endocrinology consulted, appreciate recs  - f/u Zinc transporter 8 ab  - Will require outpatient follow with Dr. Calderon (Lipidologist)     #Hyperlipidemia  - Lipid Profile: Total Cholesterol 408, Triglycerides 2025, HDL 18 mg/dL, Non HDL cholesterol 390   - TG - 812 (10/20/22)  - Apolipoprotein A1 69 (low)  - Apoliprotein B 82 wnl  - APO A1/B ratio 1.19 wnl  - TSH 0.70 wnl      #DKA  - Mild DKA      - Ph: 7.24, Bicarb 22, , + Ketones in serum and urine   - 10/19: In ED: 5 units of insulin, 2L of fluid 1 NS and 1LR  - UA: Ketones and glucose +, BHB 4.4   - a1c 12%  - Insulin gtt started 6U/hr target glucose 150-200  - Endocrinology consulted   - 10/20 DKA now resolved as AG <14 & bicarb > 18 on 2 consecutive checks  - As TG still >500, c/w insulin gtt at 2U/hr to avoid high dextrose requirements    - Now on D5 80cc/hr and LR 75cc/hr  - C-peptide 1.2 wnl  - CK 76 wnl  - f/u Glutamic acid decarboxylase abs, zinc transporter 8 abs, islet cell ab, and IA-2 ab (to r/o T1DM/RYAN)    INFECTIOUS DISEASE    - Afebrile, denies s/s of infection  - UA w/o evidence of infection   - CXR w/o evidence of infection  - febrile 10/19, f/u Bcx  - RVP negative  - COVID negative  - No active issues.    RENAL    - Cr 0.56  - Supplement lytes as needed    HEMATOLOGY/ONCOLOGY    - No active issues  - No evidence of pancreatic mass/necrosis/fluid on CT A/P; read c/w acute pancreatitis    DVT/PE ppx: Lovenox 40mg     ETHICS: Full code

## 2022-10-20 NOTE — DIETITIAN INITIAL EVALUATION ADULT - OTHER INFO
41 y/o male w/ pmh of HTN (diet-controlled) presenting w/ abdominal pain associated w/ N/V in the context of  polydipsia, polyuria and unintentional weight loss. Found to have mild DKA in the setting of acute hypertriglyceridemia-induced acute pancreatitis. Visited with pt to obtain nutrition hx. Interview was brief as medical team was waiting to intervene with patient. Pt said he had been eating well PTA but was rapidly losing weight over the past month.  lbs with current dosing wt of 182 lbs. Stated Ht of 68". He said he had been exercising more and attributed his weight loss to his increase in activity. Explained that full diet education was to follow as medical team had to meet with patient. Pt receiving Insulin gtt with IVF of D5W, LR and IVPB fluids maintained NPO. K+ lab value diminished due to physiological response of glucose influx with continuous insulin infusion. FS over past 24 hrs 114 - 164 mg/dl. Hb A1C 12%. TG^^ >2200 on admission. Na diminished: Corrected Na for glucose - 132 mg/dl. Initiate DASH/TLC, Consistent Carbohydrate therapeutic diet once pt medically stabilized. RDN services to remain available as needed.

## 2022-10-20 NOTE — PROGRESS NOTE ADULT - ASSESSMENT
42M with HTN presenting with abdominal pain, found to have HTG pancreatitis and DKA    #New onset DM (T1DM vs. KPD) with DKA with Hypertriglyceridemia induced pancreatitis   A1c 12%  pH 7.28, BHB 4.4, CO2 19, AG 17.   TG 2339  DKA and uncontrolled DM are both potential triggers for hyperTG induced pancreatitis. Improvement in DKA will improve TG levels   Patient with concern for pancreatitis and hypocalcemia on admission, made NPO, started insulin gtt, and supportive measures with fluids.  if he was to clinically worsen or pancreatitis was to worsen would need to consider plasmapheresis currently appears well, no pain, does not appear toxic     -Insulin gtt per ICU protocol  -DKA now resolved but would repeat TG level as last one was 812 last night. If close to 500 can transition to basal/bolus insulin. Give Lantus 18 units then turn off insulin drip 2 hours later. Give Lantus q24h and do not hold if NPO. Use Admelog 6 units TIDac (HOLD IF NPO). Use low dose Admelog correction scale qac and separate low dose Admelog correction scale qhs.  -If DKA resolves, but TG are still >500, insulin gtt with dextrose infusion will need to be continued.  -If TG levels do not improve/worsen and/or patient has worrisome features concerning for organ dysfunction (worsening hypocalcemia, fevers, leukocytosis, HD instability) will need to consider plasmapheresis as a possible treatment option  -Check Ab to r/o T1DM/RYAN - Glutamic Acid Decarboxylase, Zinc Transporter 8, Islet Cell Ab, and IA-2 Ab  -check cpeptide once glucotoxicity resolves   -Was started on Atorvastatin 40 mg daily, Fenofibrate 145 mg daily, and Lovaza 4g daily  -Please get a nutrition consult    For Discharge:   - pt is new to insulin and will need insulin teaching   -Basal/bolus versus basal/orals - final plan pending clinical course  -Please d/c with supplies: insulin pens, pen needles, glucometer, test strips, lancets, and alcohol pads   -Endocrine Practice at [address] [place] [address], [address], [place] [address]; Ph # [phone]    #HLD  Was started on Atorvastatin 40 mg daily, Fenofibrate 145 mg daily, and Lovaza 4g daily    #HTN  BP Goal < 130/80  Continue management per primary team    [facility]    Kam Batista DO, Endocrinology Fellow  For follow-up questions, discharge recommendations, or new consults please call answering service at [phone] (weekdays), [phone] (nights/weekends). For nonurgent matters, please email [email or web address] or [email or web address].

## 2022-10-20 NOTE — PROGRESS NOTE ADULT - SUBJECTIVE AND OBJECTIVE BOX
INTERVAL HPI/OVERNIGHT EVENTS:    SUBJECTIVE: Patient seen and examined at bedside.       VITAL SIGNS:  ICU Vital Signs Last 24 Hrs  T(C): 37.1 (20 Oct 2022 07:00), Max: 38.1 (19 Oct 2022 19:30)  T(F): 98.8 (20 Oct 2022 07:00), Max: 100.5 (19 Oct 2022 19:30)  HR: 91 (20 Oct 2022 07:00) (78 - 120)  BP: 116/71 (20 Oct 2022 07:00) (105/75 - 178/107)  BP(mean): 83 (20 Oct 2022 07:00) (83 - 121)  ABP: --  ABP(mean): --  RR: 18 (20 Oct 2022 07:00) (12 - 34)  SpO2: 96% (20 Oct 2022 07:00) (94% - 100%)    O2 Parameters below as of 20 Oct 2022 07:00  Patient On (Oxygen Delivery Method): room air            Plateau pressure:   P/F ratio:     10-19 @ 07:01  -  10- @ 07:00  --------------------------------------------------------  IN: 3320 mL / OUT: 1300 mL / NET: 2020 mL      CAPILLARY BLOOD GLUCOSE      POCT Blood Glucose.: 114 mg/dL (20 Oct 2022 06:55)    ECG:    PHYSICAL EXAM:   Physical Examination: Vital signs reviewed.   CONSTITUTIONAL: Well-appearing; well-nourished; in no apparent distress. Non-toxic appearing.   HEAD: Normocephalic, atraumatic.  EYES: PERRL, EOM intact, conjunctiva and sclera WNL.  ENT: dry mucous membranes   NECK/LYMPH: Supple; non-tender; no cervical lymphadenopathy.  CARD: Normal S1, S2; RRR  RESP: Normal chest excursion with respiration; breath sounds clear and equal bilaterally; no wheezes, rhonchi, or rales.  ABD/GI: soft, non-distended; LUQ tenderness, no guarding  EXT/MS: moves all extremities; distal pulses are normal, no pedal edema.  SKIN: Normal for age and race; warm; dry; good turgor; no apparent lesions or exudate noted.  NEURO: Awake, alert, oriented x 3, no gross deficits, CN II-XII grossly intact, no motor or sensory deficit noted.  PSYCH: Normal mood; appropriate affect.    MEDICATIONS:  MEDICATIONS  (STANDING):  atorvastatin 40 milliGRAM(s) Oral at bedtime  chlorhexidine 2% Cloths 1 Application(s) Topical <User Schedule>  coronavirus bivalent (EUA) Booster Vaccine (PFIZER) 0.3 milliLiter(s) IntraMuscular once  dextrose 5%. 1000 milliLiter(s) (50 mL/Hr) IV Continuous <Continuous>  dextrose 5%. 1000 milliLiter(s) (100 mL/Hr) IV Continuous <Continuous>  dextrose 5%. 1000 milliLiter(s) (80 mL/Hr) IV Continuous <Continuous>  dextrose 50% Injectable 25 Gram(s) IV Push once  dextrose 50% Injectable 12.5 Gram(s) IV Push once  dextrose 50% Injectable 25 Gram(s) IV Push once  enoxaparin Injectable 40 milliGRAM(s) SubCutaneous every 24 hours  fenofibrate Tablet 145 milliGRAM(s) Oral daily  glucagon  Injectable 1 milliGRAM(s) IntraMuscular once  influenza   Vaccine 0.5 milliLiter(s) IntraMuscular once  insulin regular Infusion 2 Unit(s)/Hr (2 mL/Hr) IV Continuous <Continuous>  lactated ringers. 1000 milliLiter(s) (75 mL/Hr) IV Continuous <Continuous>  lisinopril 10 milliGRAM(s) Oral daily  omega-3-Acid Ethyl Esters 4 Gram(s) Oral daily    MEDICATIONS  (PRN):  acetaminophen     Tablet .. 650 milliGRAM(s) Oral every 6 hours PRN Mild Pain (1 - 3)  dextrose Oral Gel 15 Gram(s) Oral once PRN Blood Glucose LESS THAN 70 milliGRAM(s)/deciliter  morphine  - Injectable 2 milliGRAM(s) IV Push every 4 hours PRN Moderate Pain (4 - 6)      ALLERGIES:  Allergies    No Known Allergies    Intolerances        LABS:                        13.8   13.42 )-----------( 201      ( 20 Oct 2022 02:15 )             38.8     10-20    129<L>  |  98  |  6<L>  ----------------------------<  98  3.6   |  20<L>  |  0.57    Ca    9.3      20 Oct 2022 02:15  Phos  4.2     10-20  Mg     2.00     10-20    TPro  7.0  /  Alb  3.6  /  TBili  0.5  /  DBili  x   /  AST  18  /  ALT  22  /  AlkPhos  77  10-20    PT/INR - ( 19 Oct 2022 13:35 )   PT: 13.0 sec;   INR: 1.12 ratio         PTT - ( 19 Oct 2022 13:35 )  PTT:27.5 sec  Urinalysis Basic - ( 19 Oct 2022 09:22 )    Color: Light Yellow / Appearance: Clear / S.041 / pH: x  Gluc: x / Ketone: Large  / Bili: Negative / Urobili: <2 mg/dL   Blood: x / Protein: 30 mg/dL / Nitrite: Negative   Leuk Esterase: Negative / RBC: 1 /HPF / WBC 1 /HPF   Sq Epi: x / Non Sq Epi: 0 /HPF / Bacteria: Negative        RADIOLOGY & ADDITIONAL TESTS: Reviewed.   INTERVAL HPI/OVERNIGHT EVENTS:    SUBJECTIVE: Patient seen and examined at bedside.     Patient resting comfortably, denies pain.     VITAL SIGNS:  ICU Vital Signs Last 24 Hrs  T(C): 37.1 (20 Oct 2022 07:00), Max: 38.1 (19 Oct 2022 19:30)  T(F): 98.8 (20 Oct 2022 07:00), Max: 100.5 (19 Oct 2022 19:30)  HR: 91 (20 Oct 2022 07:00) (78 - 120)  BP: 116/71 (20 Oct 2022 07:00) (105/75 - 178/107)  BP(mean): 83 (20 Oct 2022 07:00) (83 - 121)  ABP: --  ABP(mean): --  RR: 18 (20 Oct 2022 07:00) (12 - 34)  SpO2: 96% (20 Oct 2022 07:00) (94% - 100%)    O2 Parameters below as of 20 Oct 2022 07:00  Patient On (Oxygen Delivery Method): room air    Plateau pressure:   P/F ratio:     10-19 @ 07:01  -  10-20 @ 07:00  --------------------------------------------------------  IN: 3320 mL / OUT: 1300 mL / NET: 2020 mL      CAPILLARY BLOOD GLUCOSE      POCT Blood Glucose.: 114 mg/dL (20 Oct 2022 06:55)    ECG:    PHYSICAL EXAM:   Physical Examination: Vital signs reviewed.   CONSTITUTIONAL: Well-appearing; well-nourished; in no apparent distress. Non-toxic appearing.   HEAD: Normocephalic, atraumatic.  EYES: PERRL, EOM intact, conjunctiva and sclera WNL.  ENT: dry mucous membranes   NECK/LYMPH: Supple; non-tender; no cervical lymphadenopathy.  CARD: Normal S1, S2; RRR  RESP: Normal chest excursion with respiration; breath sounds clear and equal bilaterally; no wheezes, rhonchi, or rales.  ABD/GI: soft, non-distended; LUQ tenderness, no guarding  EXT/MS: moves all extremities; distal pulses are normal, no pedal edema.  SKIN: Normal for age and race; warm; dry; good turgor; no apparent lesions or exudate noted.  NEURO: Awake, alert, oriented x 3, no gross deficits, CN II-XII grossly intact, no motor or sensory deficit noted.  PSYCH: Normal mood; appropriate affect.    MEDICATIONS:  MEDICATIONS  (STANDING):  atorvastatin 40 milliGRAM(s) Oral at bedtime  chlorhexidine 2% Cloths 1 Application(s) Topical <User Schedule>  coronavirus bivalent (EUA) Booster Vaccine (PFIZER) 0.3 milliLiter(s) IntraMuscular once  dextrose 5%. 1000 milliLiter(s) (50 mL/Hr) IV Continuous <Continuous>  dextrose 5%. 1000 milliLiter(s) (100 mL/Hr) IV Continuous <Continuous>  dextrose 5%. 1000 milliLiter(s) (80 mL/Hr) IV Continuous <Continuous>  dextrose 50% Injectable 25 Gram(s) IV Push once  dextrose 50% Injectable 12.5 Gram(s) IV Push once  dextrose 50% Injectable 25 Gram(s) IV Push once  enoxaparin Injectable 40 milliGRAM(s) SubCutaneous every 24 hours  fenofibrate Tablet 145 milliGRAM(s) Oral daily  glucagon  Injectable 1 milliGRAM(s) IntraMuscular once  influenza   Vaccine 0.5 milliLiter(s) IntraMuscular once  insulin regular Infusion 2 Unit(s)/Hr (2 mL/Hr) IV Continuous <Continuous>  lactated ringers. 1000 milliLiter(s) (75 mL/Hr) IV Continuous <Continuous>  lisinopril 10 milliGRAM(s) Oral daily  omega-3-Acid Ethyl Esters 4 Gram(s) Oral daily    MEDICATIONS  (PRN):  acetaminophen     Tablet .. 650 milliGRAM(s) Oral every 6 hours PRN Mild Pain (1 - 3)  dextrose Oral Gel 15 Gram(s) Oral once PRN Blood Glucose LESS THAN 70 milliGRAM(s)/deciliter  morphine  - Injectable 2 milliGRAM(s) IV Push every 4 hours PRN Moderate Pain (4 - 6)      ALLERGIES:  Allergies    No Known Allergies    Intolerances        LABS:                        13.8   13.42 )-----------( 201      ( 20 Oct 2022 02:15 )             38.8     10-20    129<L>  |  98  |  6<L>  ----------------------------<  98  3.6   |  20<L>  |  0.57    Ca    9.3      20 Oct 2022 02:15  Phos  4.2     10-20  Mg     2.00     10-20    TPro  7.0  /  Alb  3.6  /  TBili  0.5  /  DBili  x   /  AST  18  /  ALT  22  /  AlkPhos  77  10-20    PT/INR - ( 19 Oct 2022 13:35 )   PT: 13.0 sec;   INR: 1.12 ratio         PTT - ( 19 Oct 2022 13:35 )  PTT:27.5 sec  Urinalysis Basic - ( 19 Oct 2022 09:22 )    Color: Light Yellow / Appearance: Clear / S.041 / pH: x  Gluc: x / Ketone: Large  / Bili: Negative / Urobili: <2 mg/dL   Blood: x / Protein: 30 mg/dL / Nitrite: Negative   Leuk Esterase: Negative / RBC: 1 /HPF / WBC 1 /HPF   Sq Epi: x / Non Sq Epi: 0 /HPF / Bacteria: Negative    CAPILLARY BLOOD GLUCOSE    POCT Blood Glucose.: 150 mg/dL (20 Oct 2022 13:08)    Triglycerides, Serum: 812 mg/dL (10.19.22 @ 22:30)    Blood Gas Profile - Venous (10.20.22 @ 10:15)    pH, Venous: 7.42    pCO2, Venous: 37 mmHg    pO2, Venous: 49 mmHg    HCO3, Venous: 24 mmol/L    Base Excess, Venous: -0.2 mmol/L    Oxygen Saturation, Venous: 86.3 %    Total CO2, Venous: 25.1 mmol/L  Blood Gas Venous - Lactate: 1.0 mmol/L (10.20.22 @ 10:15)    RADIOLOGY & ADDITIONAL TESTS: Reviewed.    ACC: [id number] EXAM:  CT ABDOMEN AND PELVIS IC                          PROCEDURE DATE:  10/19/2022        INTERPRETATION:  CLINICAL INFORMATION: Epigastric abdominal pain since   last night with episode of vomiting. New pancreatitis. Diabetic   ketoacidosis.    COMPARISON: None.    CONTRAST/COMPLICATIONS:  IV Contrast: Omnipaque 350  90 cc administered   10 cc discarded  Oral Contrast: NONE  Complications: None reported at time of study completion    PROCEDURE:  CT of the Abdomen and Pelvis was performed.  Arterial and Portal Venous phases were acquired.  Sagittal and coronal reformats were performed.    FINDINGS:  LOWER CHEST: Within normal limits.    LIVER: Steatosis.  BILE DUCTS: Normal caliber.  GALLBLADDER: Within normal limits.  SPLEEN: Within normal limits.  PANCREAS: Diffuse peripancreatic fat stranding and trace fluid. No   evidence of necrosis or well organized peripancreatic fluid collection.  ADRENALS: Within normal limits.  KIDNEYS/URETERS: No hydronephrosis. Left renal subcentimeter hypodensity   too small to characterize.    BLADDER: Within normal limits.  REPRODUCTIVE ORGANS: Prostate within normal limits.    BOWEL: Colonic diverticulosis without acute diverticulitis. No bowel   obstruction. Appendix is normal.  PERITONEUM: No ascites..  VESSELS: Mild atherosclerotic changes.  RETROPERITONEUM/LYMPH NODES: No lymphadenopathy.  ABDOMINAL WALL: Within normal limits.  BONES: Degenerative changes.    IMPRESSION:  Acute interstitial edematous pancreatitis.    --- Endof Report ---       INTERVAL HPI/OVERNIGHT EVENTS:  Patient febrile to 100.5 overnight. Blood culture and RVP obtained.  Abdominal pain improving.    VITAL SIGNS:  ICU Vital Signs Last 24 Hrs  T(C): 37.1 (20 Oct 2022 07:00), Max: 38.1 (19 Oct 2022 19:30)  T(F): 98.8 (20 Oct 2022 07:00), Max: 100.5 (19 Oct 2022 19:30)  HR: 91 (20 Oct 2022 07:00) (78 - 120)  BP: 116/71 (20 Oct 2022 07:00) (105/75 - 178/107)  BP(mean): 83 (20 Oct 2022 07:00) (83 - 121)  ABP: --  ABP(mean): --  RR: 18 (20 Oct 2022 07:00) (12 - 34)  SpO2: 96% (20 Oct 2022 07:00) (94% - 100%)    O2 Parameters below as of 20 Oct 2022 07:00  Patient On (Oxygen Delivery Method): room air    Plateau pressure:   P/F ratio:     10-19 @ 07:01  -  10-20 @ 07:00  --------------------------------------------------------  IN: 3320 mL / OUT: 1300 mL / NET: 2020 mL      CAPILLARY BLOOD GLUCOSE      POCT Blood Glucose.: 114 mg/dL (20 Oct 2022 06:55)    ECG:    PHYSICAL EXAM:   CONSTITUTIONAL: Well-appearing; well-nourished; in no apparent distress. Non-toxic appearing.   HEAD: Normocephalic, atraumatic.  EYES: PERRL, EOM intact, conjunctiva and sclera WNL.  NECK/LYMPH: Supple; non-tender; no cervical lymphadenopathy.  CARD: Normal S1, S2; RRR  RESP: Normal chest excursion with respiration; breath sounds clear and equal bilaterally; no wheezes, rhonchi, or rales.  ABD/GI: soft, non-distended; LUQ tenderness, no guarding  EXT/MS: moves all extremities; distal pulses are normal, no pedal edema.  SKIN: No rash  NEURO: Awake, alert, oriented x 3, no gross deficits,       MEDICATIONS:  MEDICATIONS  (STANDING):  atorvastatin 40 milliGRAM(s) Oral at bedtime  chlorhexidine 2% Cloths 1 Application(s) Topical <User Schedule>  coronavirus bivalent (EUA) Booster Vaccine (PFIZER) 0.3 milliLiter(s) IntraMuscular once  dextrose 5%. 1000 milliLiter(s) (50 mL/Hr) IV Continuous <Continuous>  dextrose 5%. 1000 milliLiter(s) (100 mL/Hr) IV Continuous <Continuous>  dextrose 5%. 1000 milliLiter(s) (80 mL/Hr) IV Continuous <Continuous>  dextrose 50% Injectable 25 Gram(s) IV Push once  dextrose 50% Injectable 12.5 Gram(s) IV Push once  dextrose 50% Injectable 25 Gram(s) IV Push once  enoxaparin Injectable 40 milliGRAM(s) SubCutaneous every 24 hours  fenofibrate Tablet 145 milliGRAM(s) Oral daily  glucagon  Injectable 1 milliGRAM(s) IntraMuscular once  influenza   Vaccine 0.5 milliLiter(s) IntraMuscular once  insulin regular Infusion 2 Unit(s)/Hr (2 mL/Hr) IV Continuous <Continuous>  lactated ringers. 1000 milliLiter(s) (75 mL/Hr) IV Continuous <Continuous>  lisinopril 10 milliGRAM(s) Oral daily  omega-3-Acid Ethyl Esters 4 Gram(s) Oral daily    MEDICATIONS  (PRN):  acetaminophen     Tablet .. 650 milliGRAM(s) Oral every 6 hours PRN Mild Pain (1 - 3)  dextrose Oral Gel 15 Gram(s) Oral once PRN Blood Glucose LESS THAN 70 milliGRAM(s)/deciliter  morphine  - Injectable 2 milliGRAM(s) IV Push every 4 hours PRN Moderate Pain (4 - 6)      ALLERGIES:  Allergies    No Known Allergies    Intolerances        LABS:                        13.8   13.42 )-----------( 201      ( 20 Oct 2022 02:15 )             38.8     10-20    129<L>  |  98  |  6<L>  ----------------------------<  98  3.6   |  20<L>  |  0.57    Ca    9.3      20 Oct 2022 02:15  Phos  4.2     10-20  Mg     2.00     10-20    TPro  7.0  /  Alb  3.6  /  TBili  0.5  /  DBili  x   /  AST  18  /  ALT  22  /  AlkPhos  77  10-20    PT/INR - ( 19 Oct 2022 13:35 )   PT: 13.0 sec;   INR: 1.12 ratio         PTT - ( 19 Oct 2022 13:35 )  PTT:27.5 sec  Urinalysis Basic - ( 19 Oct 2022 09:22 )    Color: Light Yellow / Appearance: Clear / S.041 / pH: x  Gluc: x / Ketone: Large  / Bili: Negative / Urobili: <2 mg/dL   Blood: x / Protein: 30 mg/dL / Nitrite: Negative   Leuk Esterase: Negative / RBC: 1 /HPF / WBC 1 /HPF   Sq Epi: x / Non Sq Epi: 0 /HPF / Bacteria: Negative    Triglycerides, Serum: 812 mg/dL (10.19. @ 22:30)    Blood Gas Profile - Venous (10.20.22 @ 10:15)    pH, Venous: 7.42    pCO2, Venous: 37 mmHg    pO2, Venous: 49 mmHg    HCO3, Venous: 24 mmol/L    Base Excess, Venous: -0.2 mmol/L    Oxygen Saturation, Venous: 86.3 %    Total CO2, Venous: 25.1 mmol/L  Blood Gas Venous - Lactate: 1.0 mmol/L (10.. @ 10:15)    RADIOLOGY & ADDITIONAL TESTS: Reviewed.    ACC: [id number] EXAM:  CT ABDOMEN AND PELVIS IC                          PROCEDURE DATE:  10/19/2022          INTERPRETATION:  CLINICAL INFORMATION: Epigastric abdominal pain since   last night with episode of vomiting. New pancreatitis. Diabetic   ketoacidosis.    COMPARISON: None.    CONTRAST/COMPLICATIONS:  IV Contrast: Omnipaque 350  90 cc administered   10 cc discarded  Oral Contrast: NONE  Complications: None reported at time of study completion    PROCEDURE:  CT of the Abdomen and Pelvis was performed.  Arterial and Portal Venous phases were acquired.  Sagittal and coronal reformats were performed.    FINDINGS:  LOWER CHEST: Within normal limits.    LIVER: Steatosis.  BILE DUCTS: Normal caliber.  GALLBLADDER: Within normal limits.  SPLEEN: Within normal limits.  PANCREAS: Diffuse peripancreatic fat stranding and trace fluid. No   evidence of necrosis or well organized peripancreatic fluid collection.  ADRENALS: Within normal limits.  KIDNEYS/URETERS: No hydronephrosis. Left renal subcentimeter hypodensity   too small to characterize.    BLADDER: Within normal limits.  REPRODUCTIVE ORGANS: Prostate within normal limits.    BOWEL: Colonic diverticulosis without acute diverticulitis. No bowel   obstruction. Appendix is normal.  PERITONEUM: No ascites..  VESSELS: Mild atherosclerotic changes.  RETROPERITONEUM/LYMPH NODES: No lymphadenopathy.  ABDOMINAL WALL: Within normal limits.  BONES: Degenerative changes.    IMPRESSION:  Acute interstitial edematous pancreatitis.    --- Endof Report ---   INTERVAL HPI/OVERNIGHT EVENTS:    Patient febrile to 100.5 overnight. Blood culture and RVP obtained.  Abdominal pain improving.    VITAL SIGNS:  ICU Vital Signs Last 24 Hrs  T(C): 37.1 (20 Oct 2022 07:00), Max: 38.1 (19 Oct 2022 19:30)  T(F): 98.8 (20 Oct 2022 07:00), Max: 100.5 (19 Oct 2022 19:30)  HR: 91 (20 Oct 2022 07:00) (78 - 120)  BP: 116/71 (20 Oct 2022 07:00) (105/75 - 178/107)  BP(mean): 83 (20 Oct 2022 07:00) (83 - 121)  ABP: --  ABP(mean): --  RR: 18 (20 Oct 2022 07:00) (12 - 34)  SpO2: 96% (20 Oct 2022 07:00) (94% - 100%)    O2 Parameters below as of 20 Oct 2022 07:00  Patient On (Oxygen Delivery Method): room air    Plateau pressure:   P/F ratio:     10-19 @ 07:01  -  10-20 @ 07:00  --------------------------------------------------------  IN: 3320 mL / OUT: 1300 mL / NET: 2020 mL      CAPILLARY BLOOD GLUCOSE      POCT Blood Glucose.: 114 mg/dL (20 Oct 2022 06:55)    ECG:    PHYSICAL EXAM:   CONSTITUTIONAL: Well-appearing; well-nourished; in no apparent distress. Non-toxic appearing.   HEAD: Normocephalic, atraumatic.  EYES: PERRL, EOM intact, conjunctiva and sclera WNL.  NECK/LYMPH: Supple; non-tender; no cervical lymphadenopathy.  CARD: Normal S1, S2; RRR  RESP: Normal chest excursion with respiration; breath sounds clear and equal bilaterally; no wheezes, rhonchi, or rales.  ABD/GI: soft, non-distended; LUQ tenderness, no guarding  EXT/MS: moves all extremities; distal pulses are normal, no pedal edema.  SKIN: No rash  NEURO: Awake, alert, oriented x 3, no gross deficits,       MEDICATIONS:  MEDICATIONS  (STANDING):  atorvastatin 40 milliGRAM(s) Oral at bedtime  chlorhexidine 2% Cloths 1 Application(s) Topical <User Schedule>  coronavirus bivalent (EUA) Booster Vaccine (PFIZER) 0.3 milliLiter(s) IntraMuscular once  dextrose 5%. 1000 milliLiter(s) (50 mL/Hr) IV Continuous <Continuous>  dextrose 5%. 1000 milliLiter(s) (100 mL/Hr) IV Continuous <Continuous>  dextrose 5%. 1000 milliLiter(s) (80 mL/Hr) IV Continuous <Continuous>  dextrose 50% Injectable 25 Gram(s) IV Push once  dextrose 50% Injectable 12.5 Gram(s) IV Push once  dextrose 50% Injectable 25 Gram(s) IV Push once  enoxaparin Injectable 40 milliGRAM(s) SubCutaneous every 24 hours  fenofibrate Tablet 145 milliGRAM(s) Oral daily  glucagon  Injectable 1 milliGRAM(s) IntraMuscular once  influenza   Vaccine 0.5 milliLiter(s) IntraMuscular once  insulin regular Infusion 2 Unit(s)/Hr (2 mL/Hr) IV Continuous <Continuous>  lactated ringers. 1000 milliLiter(s) (75 mL/Hr) IV Continuous <Continuous>  lisinopril 10 milliGRAM(s) Oral daily  omega-3-Acid Ethyl Esters 4 Gram(s) Oral daily    MEDICATIONS  (PRN):  acetaminophen     Tablet .. 650 milliGRAM(s) Oral every 6 hours PRN Mild Pain (1 - 3)  dextrose Oral Gel 15 Gram(s) Oral once PRN Blood Glucose LESS THAN 70 milliGRAM(s)/deciliter  morphine  - Injectable 2 milliGRAM(s) IV Push every 4 hours PRN Moderate Pain (4 - 6)      ALLERGIES:  Allergies    No Known Allergies    Intolerances        LABS:                        13.8   13.42 )-----------( 201      ( 20 Oct 2022 02:15 )             38.8     10-20    129<L>  |  98  |  6<L>  ----------------------------<  98  3.6   |  20<L>  |  0.57    Ca    9.3      20 Oct 2022 02:15  Phos  4.2     10-20  Mg     2.00     10-20    TPro  7.0  /  Alb  3.6  /  TBili  0.5  /  DBili  x   /  AST  18  /  ALT  22  /  AlkPhos  77  10-20    PT/INR - ( 19 Oct 2022 13:35 )   PT: 13.0 sec;   INR: 1.12 ratio         PTT - ( 19 Oct 2022 13:35 )  PTT:27.5 sec  Urinalysis Basic - ( 19 Oct 2022 09:22 )    Color: Light Yellow / Appearance: Clear / S.041 / pH: x  Gluc: x / Ketone: Large  / Bili: Negative / Urobili: <2 mg/dL   Blood: x / Protein: 30 mg/dL / Nitrite: Negative   Leuk Esterase: Negative / RBC: 1 /HPF / WBC 1 /HPF   Sq Epi: x / Non Sq Epi: 0 /HPF / Bacteria: Negative    Triglycerides, Serum: 812 mg/dL (10.19. @ 22:30)    Blood Gas Profile - Venous (10.20.22 @ 10:15)    pH, Venous: 7.42    pCO2, Venous: 37 mmHg    pO2, Venous: 49 mmHg    HCO3, Venous: 24 mmol/L    Base Excess, Venous: -0.2 mmol/L    Oxygen Saturation, Venous: 86.3 %    Total CO2, Venous: 25.1 mmol/L  Blood Gas Venous - Lactate: 1.0 mmol/L (10.. @ 10:15)    RADIOLOGY & ADDITIONAL TESTS: Reviewed.    ACC: [id number] EXAM:  CT ABDOMEN AND PELVIS IC                          PROCEDURE DATE:  10/19/2022          INTERPRETATION:  CLINICAL INFORMATION: Epigastric abdominal pain since   last night with episode of vomiting. New pancreatitis. Diabetic   ketoacidosis.    COMPARISON: None.    CONTRAST/COMPLICATIONS:  IV Contrast: Omnipaque 350  90 cc administered   10 cc discarded  Oral Contrast: NONE  Complications: None reported at time of study completion    PROCEDURE:  CT of the Abdomen and Pelvis was performed.  Arterial and Portal Venous phases were acquired.  Sagittal and coronal reformats were performed.    FINDINGS:  LOWER CHEST: Within normal limits.    LIVER: Steatosis.  BILE DUCTS: Normal caliber.  GALLBLADDER: Within normal limits.  SPLEEN: Within normal limits.  PANCREAS: Diffuse peripancreatic fat stranding and trace fluid. No   evidence of necrosis or well organized peripancreatic fluid collection.  ADRENALS: Within normal limits.  KIDNEYS/URETERS: No hydronephrosis. Left renal subcentimeter hypodensity   too small to characterize.    BLADDER: Within normal limits.  REPRODUCTIVE ORGANS: Prostate within normal limits.    BOWEL: Colonic diverticulosis without acute diverticulitis. No bowel   obstruction. Appendix is normal.  PERITONEUM: No ascites..  VESSELS: Mild atherosclerotic changes.  RETROPERITONEUM/LYMPH NODES: No lymphadenopathy.  ABDOMINAL WALL: Within normal limits.  BONES: Degenerative changes.    IMPRESSION:  Acute interstitial edematous pancreatitis.    --- Endof Report ---    CONSULTS    Assessment and Recommendation:   · Assessment	  42M with HTN presenting with abdominal pain, found to have HTG pancreatitis and DKA    #New onset DM (T1DM vs. KPD) with DKA with Hypertriglyceridemia induced pancreatitis   A1c 12%  pH 7.28, BHB 4.4, CO2 19, AG 17.   TG 2339  DKA and uncontrolled DM are both potential triggers for hyperTG induced pancreatitis. Improvement in DKA will improve TG levels     Recs:   -Patient with concern for pancreatitis at this time and hypocalcemia, so continue NPO status, insulin gtt, and supportive measures with fluids.  -if he was to clinically worsen or pancreatitis was to worsen would need to consider plasmapheresis currently appears well, has pain but does not appear toxic     -Insulin gtt per ICU protocol to treat DKA -   -Thr prior is correcting the DKA with insulin gtt according to ICU DKA protocol. DKA resolution is defined as AG < 14 and bicarb > 18 on 2 consecutive BMP checks   -Trend TG q12 hrs and check BMP, BHB, VBG q4 hrs for now  -If DKA resolves, but TG are still >500, insulin gtt will need to be continued. In that case, the insulin gtt should continue at a lower rate (1-2 units/hr) than required for DKA to avoid high dextrose requirements which can potentially worsen TG level  - once DKA resolves, and If FS < 200 while on insulin gtt, can start dexrose at lower rate (preferably 25-50 cc/hr to prevent worsening hypertriglyceridemia). Can consider using different IV line for these fluids so LR can run at higher rate on another line  -If TG levels do not improve/worsen and/or patient has worrisome features concerning for organ dysfunction (worsening hypocalcemia, fevers, leukocytosis, HD instability) will need to consider plasmapheresis as a possible treatment option  -Check Ab to r/o T1DM/RYAN - Glutamic Acid Decarboxylase, Zinc Transporter 8, Islet Cell Ab, and IA-2 Ab  -check cpeptide once glucotoxicity resolves   -Would recommend initiation of Atorvastatin 40 mg daily, Fenofibrate 145 mg daily, and Lovaza 2g BID when tolerating PO    For Discharge:     - pt is new to insulin and will need insulin teaching   -Basal/bolus - final doses TBD  -Please d/c with supplies: insulin pens, pen needles, glucometer, test strips, lancets, and alcohol pads   -[facility] Practice at [address] [place] [address], [address], [place] [address]; Ph # [phone]    #HLD  Start Atorvastatin 40 mg daily as above    #HTN  BP Goal < 130/80  Continue management per primary team    Natalia Chaney MD  Endocrine Fellow  Can be reached via teams.     For all urgent matters, can call answering service at [phone] (weekdays); [phone] (nights/weekends)  Any non-urgent consults or questions can be emailed to [email or web address] or [email or web address]

## 2022-10-21 ENCOUNTER — TRANSCRIPTION ENCOUNTER (OUTPATIENT)
Age: 42
End: 2022-10-21

## 2022-10-21 LAB
ALBUMIN SERPL ELPH-MCNC: 3.2 G/DL — LOW (ref 3.3–5)
ALP SERPL-CCNC: 67 U/L — SIGNIFICANT CHANGE UP (ref 40–120)
ALT FLD-CCNC: 20 U/L — SIGNIFICANT CHANGE UP (ref 4–41)
ANION GAP SERPL CALC-SCNC: 12 MMOL/L — SIGNIFICANT CHANGE UP (ref 7–14)
AST SERPL-CCNC: 15 U/L — SIGNIFICANT CHANGE UP (ref 4–40)
B-OH-BUTYR SERPL-SCNC: 0.5 MMOL/L — HIGH (ref 0–0.4)
BILIRUB SERPL-MCNC: 0.5 MG/DL — SIGNIFICANT CHANGE UP (ref 0.2–1.2)
BUN SERPL-MCNC: 5 MG/DL — LOW (ref 7–23)
CALCIUM SERPL-MCNC: 9 MG/DL — SIGNIFICANT CHANGE UP (ref 8.4–10.5)
CHLORIDE SERPL-SCNC: 100 MMOL/L — SIGNIFICANT CHANGE UP (ref 98–107)
CO2 SERPL-SCNC: 22 MMOL/L — SIGNIFICANT CHANGE UP (ref 22–31)
CREAT SERPL-MCNC: 0.68 MG/DL — SIGNIFICANT CHANGE UP (ref 0.5–1.3)
EGFR: 121 ML/MIN/1.73M2 — SIGNIFICANT CHANGE UP
GLUCOSE BLDC GLUCOMTR-MCNC: 136 MG/DL — HIGH (ref 70–99)
GLUCOSE BLDC GLUCOMTR-MCNC: 139 MG/DL — HIGH (ref 70–99)
GLUCOSE BLDC GLUCOMTR-MCNC: 139 MG/DL — HIGH (ref 70–99)
GLUCOSE BLDC GLUCOMTR-MCNC: 141 MG/DL — HIGH (ref 70–99)
GLUCOSE BLDC GLUCOMTR-MCNC: 145 MG/DL — HIGH (ref 70–99)
GLUCOSE BLDC GLUCOMTR-MCNC: 145 MG/DL — HIGH (ref 70–99)
GLUCOSE BLDC GLUCOMTR-MCNC: 156 MG/DL — HIGH (ref 70–99)
GLUCOSE BLDC GLUCOMTR-MCNC: 256 MG/DL — HIGH (ref 70–99)
GLUCOSE SERPL-MCNC: 142 MG/DL — HIGH (ref 70–99)
LIDOCAIN SERPL-MCNC: 22.2 NMOL/L — SIGNIFICANT CHANGE UP
MAGNESIUM SERPL-MCNC: 1.7 MG/DL — SIGNIFICANT CHANGE UP (ref 1.6–2.6)
PHOSPHATE SERPL-MCNC: 2.9 MG/DL — SIGNIFICANT CHANGE UP (ref 2.5–4.5)
POTASSIUM SERPL-MCNC: 3.7 MMOL/L — SIGNIFICANT CHANGE UP (ref 3.5–5.3)
POTASSIUM SERPL-SCNC: 3.7 MMOL/L — SIGNIFICANT CHANGE UP (ref 3.5–5.3)
PROT SERPL-MCNC: 6.4 G/DL — SIGNIFICANT CHANGE UP (ref 6–8.3)
SODIUM SERPL-SCNC: 134 MMOL/L — LOW (ref 135–145)
TRIGL SERPL-MCNC: 435 MG/DL — HIGH

## 2022-10-21 PROCEDURE — 99233 SBSQ HOSP IP/OBS HIGH 50: CPT

## 2022-10-21 PROCEDURE — 99233 SBSQ HOSP IP/OBS HIGH 50: CPT | Mod: GC

## 2022-10-21 RX ORDER — INSULIN LISPRO 100/ML
VIAL (ML) SUBCUTANEOUS
Refills: 0 | Status: DISCONTINUED | OUTPATIENT
Start: 2022-10-21 | End: 2022-10-22

## 2022-10-21 RX ORDER — SODIUM,POTASSIUM PHOSPHATES 278-250MG
1 POWDER IN PACKET (EA) ORAL ONCE
Refills: 0 | Status: COMPLETED | OUTPATIENT
Start: 2022-10-21 | End: 2022-10-21

## 2022-10-21 RX ORDER — INSULIN LISPRO 100/ML
VIAL (ML) SUBCUTANEOUS AT BEDTIME
Refills: 0 | Status: DISCONTINUED | OUTPATIENT
Start: 2022-10-21 | End: 2022-10-22

## 2022-10-21 RX ORDER — INSULIN LISPRO 100/ML
6 VIAL (ML) SUBCUTANEOUS ONCE
Refills: 0 | Status: COMPLETED | OUTPATIENT
Start: 2022-10-21 | End: 2022-10-21

## 2022-10-21 RX ORDER — MAGNESIUM SULFATE 500 MG/ML
2 VIAL (ML) INJECTION ONCE
Refills: 0 | Status: COMPLETED | OUTPATIENT
Start: 2022-10-21 | End: 2022-10-21

## 2022-10-21 RX ORDER — MAGNESIUM SULFATE 500 MG/ML
2 VIAL (ML) INJECTION ONCE
Refills: 0 | Status: DISCONTINUED | OUTPATIENT
Start: 2022-10-21 | End: 2022-10-21

## 2022-10-21 RX ORDER — INSULIN GLARGINE 100 [IU]/ML
18 INJECTION, SOLUTION SUBCUTANEOUS AT BEDTIME
Refills: 0 | Status: DISCONTINUED | OUTPATIENT
Start: 2022-10-21 | End: 2022-10-22

## 2022-10-21 RX ORDER — POTASSIUM CHLORIDE 20 MEQ
40 PACKET (EA) ORAL ONCE
Refills: 0 | Status: COMPLETED | OUTPATIENT
Start: 2022-10-21 | End: 2022-10-21

## 2022-10-21 RX ORDER — INSULIN LISPRO 100/ML
6 VIAL (ML) SUBCUTANEOUS
Refills: 0 | Status: DISCONTINUED | OUTPATIENT
Start: 2022-10-21 | End: 2022-10-22

## 2022-10-21 RX ADMIN — ENOXAPARIN SODIUM 40 MILLIGRAM(S): 100 INJECTION SUBCUTANEOUS at 17:16

## 2022-10-21 RX ADMIN — ATORVASTATIN CALCIUM 40 MILLIGRAM(S): 80 TABLET, FILM COATED ORAL at 22:08

## 2022-10-21 RX ADMIN — Medication 4 GRAM(S): at 12:33

## 2022-10-21 RX ADMIN — Medication 6 UNIT(S): at 12:23

## 2022-10-21 RX ADMIN — Medication 25 GRAM(S): at 05:01

## 2022-10-21 RX ADMIN — Medication 6 UNIT(S): at 17:38

## 2022-10-21 RX ADMIN — CHLORHEXIDINE GLUCONATE 1 APPLICATION(S): 213 SOLUTION TOPICAL at 04:56

## 2022-10-21 RX ADMIN — Medication 145 MILLIGRAM(S): at 12:33

## 2022-10-21 RX ADMIN — Medication 40 MILLIEQUIVALENT(S): at 05:00

## 2022-10-21 RX ADMIN — Medication 3: at 12:24

## 2022-10-21 RX ADMIN — Medication 1 PACKET(S): at 09:14

## 2022-10-21 RX ADMIN — INSULIN GLARGINE 18 UNIT(S): 100 INJECTION, SOLUTION SUBCUTANEOUS at 22:08

## 2022-10-21 RX ADMIN — LISINOPRIL 10 MILLIGRAM(S): 2.5 TABLET ORAL at 05:01

## 2022-10-21 NOTE — DISCHARGE NOTE PROVIDER - NSDCCPCAREPLAN_GEN_ALL_CORE_FT
PRINCIPAL DISCHARGE DIAGNOSIS  Diagnosis: DKA (diabetic ketoacidosis)  Assessment and Plan of Treatment: You were found to have diabetic ketoacidosis.  A1C with Estimated Average Glucose Result: 12.0%  You were seen by endocrinology team.  You were started on Insulin before meals and at bedtime.  Please follow upw ith endocrinology as outpatient.  Please follow up with your primary care provider within 1 week of discharge from the hospital for repeat blood work and further management. If you do not have a primary care provider please follow up with the [facility], call [phone]      SECONDARY DISCHARGE DIAGNOSES  Diagnosis: Hypertension  Assessment and Plan of Treatment: Continue with Lisinopril.  Continue blood pressure medication regimen as directed. Monitor for any visual changes, headaches or dizziness.  Monitor blood pressure regularly.  Follow up with your PCP for further management for high blood pressure.    Diagnosis: Pancreatitis  Assessment and Plan of Treatment: You were found to have pancreatitis (inflammation of the pancrease) due very high triglyceride levels.  You were started on Atorvastain, fenofibrate, and Omega 3  Lovaza was recommended but will need further prior authorization for approval with insurance. Continue other the counter omega 3 supplementation for now. Please follow up with your primary care provider within 1 week of discharge from the hospital for further management and insurance approval for Lovaza.    Diagnosis: Hypertriglyceridemia  Assessment and Plan of Treatment:

## 2022-10-21 NOTE — DISCHARGE NOTE PROVIDER - PROVIDER TOKENS
PROVIDER:[TOKEN:[[id number]],FOLLOWUP:[1 month]] PROVIDER:[TOKEN:[[id number]],FOLLOWUP:[1 month]],FREE:[LAST:[Endocrinology],PHONE:[(   )    -],FAX:[(   )    -],ADDRESS:[-Endocrine Practice at [address] [place] [address], [place] [address]; Ph # [phone]],FOLLOWUP:[2 weeks]]

## 2022-10-21 NOTE — PROGRESS NOTE ADULT - SUBJECTIVE AND OBJECTIVE BOX
INTERVAL HPI/OVERNIGHT EVENTS:    SUBJECTIVE: Patient seen and examined at bedside.       VITAL SIGNS:  ICU Vital Signs Last 24 Hrs  T(C): 37 (21 Oct 2022 04:00), Max: 37.1 (20 Oct 2022 16:00)  T(F): 98.6 (21 Oct 2022 04:00), Max: 98.7 (20 Oct 2022 16:00)  HR: 70 (21 Oct 2022 06:00) (69 - 94)  BP: 102/64 (21 Oct 2022 06:00) (102/64 - 133/86)  BP(mean): 76 (21 Oct 2022 06:00) (76 - 106)  ABP: --  ABP(mean): --  RR: 11 (21 Oct 2022 06:00) (10 - 25)  SpO2: 97% (21 Oct 2022 06:00) (96% - 100%)    O2 Parameters below as of 21 Oct 2022 06:00  Patient On (Oxygen Delivery Method): room air            Plateau pressure:   P/F ratio:     10-20 @ 07:01  -  10- @ 07:00  --------------------------------------------------------  IN: 3471 mL / OUT: 3850 mL / NET: -379 mL      CAPILLARY BLOOD GLUCOSE      POCT Blood Glucose.: 145 mg/dL (21 Oct 2022 06:12)    ECG:    PHYSICAL EXAM:    General:   HEENT:   Neck:   Respiratory:   Cardiovascular:   Abdomen:   Extremities:  Neurological:    MEDICATIONS:  MEDICATIONS  (STANDING):  atorvastatin 40 milliGRAM(s) Oral at bedtime  chlorhexidine 2% Cloths 1 Application(s) Topical <User Schedule>  coronavirus bivalent (EUA) Booster Vaccine (PFIZER) 0.3 milliLiter(s) IntraMuscular once  dextrose 5%. 1000 milliLiter(s) (50 mL/Hr) IV Continuous <Continuous>  dextrose 5%. 1000 milliLiter(s) (100 mL/Hr) IV Continuous <Continuous>  dextrose 50% Injectable 25 Gram(s) IV Push once  dextrose 50% Injectable 12.5 Gram(s) IV Push once  dextrose 50% Injectable 25 Gram(s) IV Push once  enoxaparin Injectable 40 milliGRAM(s) SubCutaneous every 24 hours  fenofibrate Tablet 145 milliGRAM(s) Oral daily  glucagon  Injectable 1 milliGRAM(s) IntraMuscular once  influenza   Vaccine 0.5 milliLiter(s) IntraMuscular once  lactated ringers. 1000 milliLiter(s) (100 mL/Hr) IV Continuous <Continuous>  lisinopril 10 milliGRAM(s) Oral daily  omega-3-Acid Ethyl Esters 4 Gram(s) Oral daily  potassium phosphate / sodium phosphate Powder (PHOS-NaK) 1 Packet(s) Oral once    MEDICATIONS  (PRN):  acetaminophen     Tablet .. 650 milliGRAM(s) Oral every 6 hours PRN Mild Pain (1 - 3)  dextrose Oral Gel 15 Gram(s) Oral once PRN Blood Glucose LESS THAN 70 milliGRAM(s)/deciliter  morphine  - Injectable 2 milliGRAM(s) IV Push every 4 hours PRN Moderate Pain (4 - 6)      ALLERGIES:  Allergies    No Known Allergies    Intolerances        LABS:                        12.7   9.88  )-----------( 194      ( 20 Oct 2022 23:50 )             36.4     10-20    134<L>  |  100  |  5<L>  ----------------------------<  142<H>  3.7   |  22  |  0.68    Ca    9.0      20 Oct 2022 23:50  Phos  2.9     10-20  Mg     1.70     10-20    TPro  6.4  /  Alb  3.2<L>  /  TBili  0.5  /  DBili  x   /  AST  15  /  ALT  20  /  AlkPhos  67  10-20    PT/INR - ( 19 Oct 2022 13:35 )   PT: 13.0 sec;   INR: 1.12 ratio         PTT - ( 19 Oct 2022 13:35 )  PTT:27.5 sec  Urinalysis Basic - ( 19 Oct 2022 09:22 )    Color: Light Yellow / Appearance: Clear / S.041 / pH: x  Gluc: x / Ketone: Large  / Bili: Negative / Urobili: <2 mg/dL   Blood: x / Protein: 30 mg/dL / Nitrite: Negative   Leuk Esterase: Negative / RBC: 1 /HPF / WBC 1 /HPF   Sq Epi: x / Non Sq Epi: 0 /HPF / Bacteria: Negative        RADIOLOGY & ADDITIONAL TESTS: Reviewed.   INTERVAL HPI/OVERNIGHT EVENTS:    SUBJECTIVE: Patient seen and examined at bedside.       VITAL SIGNS:  ICU Vital Signs Last 24 Hrs  T(C): 37 (21 Oct 2022 04:00), Max: 37.1 (20 Oct 2022 16:00)  T(F): 98.6 (21 Oct 2022 04:00), Max: 98.7 (20 Oct 2022 16:00)  HR: 70 (21 Oct 2022 06:00) (69 - 94)  BP: 102/64 (21 Oct 2022 06:00) (102/64 - 133/86)  BP(mean): 76 (21 Oct 2022 06:00) (76 - 106)  ABP: --  ABP(mean): --  RR: 11 (21 Oct 2022 06:00) (10 - 25)  SpO2: 97% (21 Oct 2022 06:00) (96% - 100%)    O2 Parameters below as of 21 Oct 2022 06:00  Patient On (Oxygen Delivery Method): room air                10-20 @ 07:01  -  10-21 @ 07:00  --------------------------------------------------------  IN: 3471 mL / OUT: 3850 mL / NET: -379 mL      CAPILLARY BLOOD GLUCOSE      POCT Blood Glucose.: 145 mg/dL (21 Oct 2022 06:12)    ECG:    PHYSICAL EXAM:    Physical Exam:   Constitutional: NAD   HEENT: + PERRLA, EOMI, no drainage or redness  Neck: supple,  No JVD  Respiratory: clear Sounds equal bilaterally to auscultation, no accessory muscle use noted  Cardiovascular: Regular rate, regular rhythm, normal S1, S2; no murmurs or rub  Gastrointestinal: Soft, non-tender, non distended, no hepatosplenomegaly, normal bowel sounds  Extremities: no peripheral edema, no cyanosis, no clubbing   Vascular: Equal and normal pulses: 2+ peripheral pulses throughout  Neurological: alert and oriented, ambulating in room   Psychiatric: calm, normal mood, normal affect  Musculoskeletal: No joint swelling or deformity; no limitation of movement  Skin: warm, dry, well perfused, no rashes      MEDICATIONS:  MEDICATIONS  (STANDING):  atorvastatin 40 milliGRAM(s) Oral at bedtime  chlorhexidine 2% Cloths 1 Application(s) Topical <User Schedule>  coronavirus bivalent (EUA) Booster Vaccine (PFIZER) 0.3 milliLiter(s) IntraMuscular once  dextrose 5%. 1000 milliLiter(s) (50 mL/Hr) IV Continuous <Continuous>  dextrose 5%. 1000 milliLiter(s) (100 mL/Hr) IV Continuous <Continuous>  dextrose 50% Injectable 25 Gram(s) IV Push once  dextrose 50% Injectable 12.5 Gram(s) IV Push once  dextrose 50% Injectable 25 Gram(s) IV Push once  enoxaparin Injectable 40 milliGRAM(s) SubCutaneous every 24 hours  fenofibrate Tablet 145 milliGRAM(s) Oral daily  glucagon  Injectable 1 milliGRAM(s) IntraMuscular once  influenza   Vaccine 0.5 milliLiter(s) IntraMuscular once  lactated ringers. 1000 milliLiter(s) (100 mL/Hr) IV Continuous <Continuous>  lisinopril 10 milliGRAM(s) Oral daily  omega-3-Acid Ethyl Esters 4 Gram(s) Oral daily  potassium phosphate / sodium phosphate Powder (PHOS-NaK) 1 Packet(s) Oral once    MEDICATIONS  (PRN):  acetaminophen     Tablet .. 650 milliGRAM(s) Oral every 6 hours PRN Mild Pain (1 - 3)  dextrose Oral Gel 15 Gram(s) Oral once PRN Blood Glucose LESS THAN 70 milliGRAM(s)/deciliter  morphine  - Injectable 2 milliGRAM(s) IV Push every 4 hours PRN Moderate Pain (4 - 6)      ALLERGIES:  Allergies    No Known Allergies    Intolerances        LABS:                        12.7   9.88  )-----------( 194      ( 20 Oct 2022 23:50 )             36.4     10-20    134<L>  |  100  |  5<L>  ----------------------------<  142<H>  3.7   |  22  |  0.68    Ca    9.0      20 Oct 2022 23:50  Phos  2.9     10-20  Mg     1.70     10-20    TPro  6.4  /  Alb  3.2<L>  /  TBili  0.5  /  DBili  x   /  AST  15  /  ALT  20  /  AlkPhos  67  10-20    PT/INR - ( 19 Oct 2022 13:35 )   PT: 13.0 sec;   INR: 1.12 ratio         PTT - ( 19 Oct 2022 13:35 )  PTT:27.5 sec  Urinalysis Basic - ( 19 Oct 2022 09:22 )    Color: Light Yellow / Appearance: Clear / S.041 / pH: x  Gluc: x / Ketone: Large  / Bili: Negative / Urobili: <2 mg/dL   Blood: x / Protein: 30 mg/dL / Nitrite: Negative   Leuk Esterase: Negative / RBC: 1 /HPF / WBC 1 /HPF   Sq Epi: x / Non Sq Epi: 0 /HPF / Bacteria: Negative        RADIOLOGY & ADDITIONAL TESTS: Reviewed.

## 2022-10-21 NOTE — DISCHARGE NOTE PROVIDER - NSDCFUADDAPPT_GEN_ALL_CORE_FT
- Endocrine Practice at [address] [place] [address], [address], [place] [address]; Ph # [phone]   - Endocrine Practice at [address] [place] [address], [address], [place] [address];  # [phone].    Please follow up with your primary care provider within 1 week of discharge from the hospital. If you do not have a primary care provider please follow up with the [facility], call [phone]

## 2022-10-21 NOTE — PROGRESS NOTE ADULT - ASSESSMENT
Patient is a 42-year-old male w/ pmh of HTN (diet-controlled) presenting w/ abdominal pain associated w/ N/V in the context of  polydipsia, polyuria and unintentional weight loss. Found to have mild DKA in the setting of acute hypertriglyceridemia-induced acute pancreatitis. Consideration for underlying malignancy or autoimmune pathology cannot be excluded.     PLAN:    NEURO    #Pain   - A/Ox4 at baseline  - Neuro checks q4  - morphine 4 mg IV q4 PRN    CARDIOVASCULAR    #HTN   - Home meds: None   ---- Lisinopril 10mg PO daily    #ASCVD Risk:   - Patient endorses long-standing history of hypertension, managed with diet only  - Lipid Profile: Total Cholesterol 408, Triglycerides 2025, HDL 18 mg/dL, Non HDL cholesterol 390   - f/u Lipoprotein A and Apoliprotein B   - f/u TTE      PULMONARY  - No active issues      GASTROINTESTINAL    #Acute pancreatitis   - 2/2 to triglycerides   - Abdominal pain + Lipase  - CT a/p 10/19 no evidence of necrosis, abscess, or fluid collection   - NPO except medications   - D5 @ 80 cc/hr   - LR @ 75 cc/hr  - BISAP score 0, Ritesh: 1  - ionized calcium - 1.08    ENDOCRINE    #Hypertriglyceridemia   - Unclear etiology   - 10/19: Triglycerides 2339 on admission  - Trend triglycerides q12h   - goal triglycerides < 500, will switch to basal  - Insulin ggt at 2u/hour  - Fenofibrate 145mg PO daily  - Lovaza 4mg PO daily  - Atorvastatin 40mg PO daily  - Endocrinology consulted, appreciate recs  - f/u Zinc transporter 8 ab  - Will require outpatient follow with Dr. Calderon (Lipidologist)     #Hyperlipidemia  - Lipid Profile: Total Cholesterol 408, Triglycerides 2025, HDL 18 mg/dL, Non HDL cholesterol 390   - TG - 812 (10/20/22)  - Apolipoprotein A1 69 (low)  - Apoliprotein B 82 wnl  - APO A1/B ratio 1.19 wnl  - TSH 0.70 wnl      #DKA  - Mild DKA      - Ph: 7.24, Bicarb 22, , + Ketones in serum and urine   - 10/19: In ED: 5 units of insulin, 2L of fluid 1 NS and 1LR  - UA: Ketones and glucose +, BHB 4.4   - a1c 12%  - Insulin gtt started 6U/hr target glucose 150-200  - Endocrinology consulted   - 10/20 DKA now resolved as AG <14 & bicarb > 18 on 2 consecutive checks  - As TG still >500, c/w insulin gtt at 2U/hr to avoid high dextrose requirements    - Now on D5 80cc/hr and LR 75cc/hr  - C-peptide 1.2 wnl  - CK 76 wnl  - f/u Glutamic acid decarboxylase abs, zinc transporter 8 abs, islet cell ab, and IA-2 ab (to r/o T1DM/RYAN)    INFECTIOUS DISEASE    - Afebrile, denies s/s of infection  - UA w/o evidence of infection   - CXR w/o evidence of infection  - febrile 10/19, f/u Bcx  - RVP negative  - COVID negative  - No active issues.    RENAL    - Cr 0.56  - Supplement lytes as needed    HEMATOLOGY/ONCOLOGY    - No active issues  - No evidence of pancreatic mass/necrosis/fluid on CT A/P; read c/w acute pancreatitis    DVT/PE ppx: Lovenox 40mg     ETHICS: Full code    This is a 42-year-old male w/ pmh of HTN (diet-controlled) presenting w/ abdominal pain associated w/ N/V in the context of  polydipsia, polyuria and unintentional weight loss. Found to have mild DKA in the setting of acute hypertriglyceridemia-induced acute pancreatitis. CT scan negative for malignancy now being w/u for autoimmune pathology.       NEURO  A/Ox4 at baseline, no acute issues, intially presented with abdominal pain, now resolved   - morphine 4 mg IV q4 PRN, has not required is okay with tylenol prn       PULMONARY  - No active issues  -on room air     CARDIOVASCULAR  Hx of HTN and HLD  -c/w Lisinopril 10mg PO daily  -c/w atorvastatin   - Lipid Profile: Total Cholesterol 408, Triglycerides 2025, HDL 18 mg/dL, Non HDL cholesterol 390   - f/u Lipoprotein A and Apoliprotein B   - f/u TTE      GASTROINTESTINAL  Acute pancreatitis 2/2 to hypertriglyceridemia   - Abdominal pain + Lipase  - CT a/p 10/19 no evidence of necrosis, abscess, or fluid collection   -now tolerating diet  - BISAP score 0, Ritesh: 1  - ionized calcium - 1.08    ENDOCRINE  Newly diagnosed DM a1c 12% presented with mild DKA/hypertriglyceridemia in the setting of acute pancreatitis pH: 7.24, Bicarb 22, , + Ketones, BHB 4.4, required insulin gtt now off since 10/20   - 10/19: Triglycerides 2339 on admission  -c/w  Fenofibrate 145mg PO daily and Lovaza 4mg PO daily  transitioned off insulin gtt, now on sliding scale, before meal coverage and lantus   - Endocrinology following   - f/u Zinc transporter 8 ab  - Will require outpatient follow with Dr. Calderon (Lipidologist)   - f/u Glutamic acid decarboxylase abs, zinc transporter 8 abs, islet cell ab, and IA-2 ab (to r/o T1DM/RYAN)    INFECTIOUS DISEASE  No s/s of infection now afebrile   - UA w/o evidence of infection   - CXR w/o evidence of infection  - febrile 10/19, f/u Bcx with NGTD  - RVP negative  - COVID negative      RENAL  No ISIS   -voiding without any issues    HEMATOLOGY/ONCOLOGY  - No active issues  - No evidence of pancreatic mass/necrosis/fluid on CT A/P; read c/w acute pancreatitis    DVT/PE ppx:  -c/w Lovenox 40mg     Ethics/Dispo:  -full code  -made eligible to medicine floors vs possible D/C home

## 2022-10-21 NOTE — PROGRESS NOTE ADULT - ATTENDING COMMENTS
Patient seen and examined at bedside with the MICU Resident. Agree with above.     42-year-old male w/ pmh of HTN (diet-controlled) presenting w/ abdominal pain associated w/ N/V in the context of  polydipsia, polyuria and unintentional weight loss. Patient feeling better since admission but found to have TG induced pancreatitis as well as mild DKA    GAP is closing, TG improved, pain well controlled. CT with acute pancreatitis    # Pancreatitis  # DKA  # Hypertriglyceridemia  # Epigastric abn pain  #HTN  - IVF, Pain control  - insulin gtt for both mild DKA and TG induced pancreatitis  - Would aim for the lowest dose of insulin without needing large volume of D5LR as possible once GAP is closed  - NPO for now  - Added ACEI  - Statins, add fibrates, Lovaza  - DVT ppx- Lovenox  - GOC - Fuill code
New onset DM2 uncontrolled with DKA on admission and severe hypertriglyceridemia pancreatitis. S/p insulin drip in ICU. Anion gap normalized. Check triglyceride level is < or approaching 500 can transition off drip to basal bolus plan as outlined.  Discussed with patient recommend dc on basal bolus insulin pens, and to follow with endocrine outpatient. Will need pen teaching, nutritional counselling. Patient is high risk and high level decision making, requiring ICU level of care.    Jerrell Hannon MD  Division of Endocrinology  Pager: [phone]    If after 6PM or before 9AM, or on weekends/holidays, please call endocrine answering service for assistance ([phone]).  For nonurgent matters email [email or web address] for assistance.
New onset DM2 uncontrolled with DKA on admission and severe hypertriglyceridemia pancreatitis. S/p insulin drip in ICU. Anion gap normalized.   Discussed with patient recommend dc on basal bolus insulin pens, and to follow with endocrine outpatient. Will need pen teaching, nutritional counselling. Patient is high risk and high level decision making, requiring ICU level of care.    Jerrell Hannon MD  Division of Endocrinology  Pager: [phone]    If after 6PM or before 9AM, or on weekends/holidays, please call endocrine answering service for assistance ([phone]).  For nonurgent matters email [email or web address] for assistance.

## 2022-10-21 NOTE — PROGRESS NOTE ADULT - SUBJECTIVE AND OBJECTIVE BOX
Chief Complaint: Acute pancreatitis 2/2 hypertriglyceridemia, DKA    History: Feeling well, no abd pain, N/V. Eating, first meal this morning. Did not receive insulin with first meal today. Received insulin pen teaching and patient able to give himself his lunchtime insulin.    MEDICATIONS  (STANDING):  atorvastatin 40 milliGRAM(s) Oral at bedtime  chlorhexidine 2% Cloths 1 Application(s) Topical <User Schedule>  coronavirus bivalent (EUA) Booster Vaccine (PFIZER) 0.3 milliLiter(s) IntraMuscular once  dextrose 5%. 1000 milliLiter(s) (50 mL/Hr) IV Continuous <Continuous>  dextrose 5%. 1000 milliLiter(s) (100 mL/Hr) IV Continuous <Continuous>  dextrose 50% Injectable 25 Gram(s) IV Push once  dextrose 50% Injectable 12.5 Gram(s) IV Push once  dextrose 50% Injectable 25 Gram(s) IV Push once  enoxaparin Injectable 40 milliGRAM(s) SubCutaneous every 24 hours  fenofibrate Tablet 145 milliGRAM(s) Oral daily  glucagon  Injectable 1 milliGRAM(s) IntraMuscular once  influenza   Vaccine 0.5 milliLiter(s) IntraMuscular once  insulin glargine Injectable (LANTUS) 18 Unit(s) SubCutaneous at bedtime  insulin lispro (ADMELOG) corrective regimen sliding scale   SubCutaneous three times a day before meals  insulin lispro (ADMELOG) corrective regimen sliding scale   SubCutaneous at bedtime  insulin lispro Injectable (ADMELOG) 6 Unit(s) SubCutaneous three times a day before meals  lisinopril 10 milliGRAM(s) Oral daily  omega-3-Acid Ethyl Esters 4 Gram(s) Oral daily    MEDICATIONS  (PRN):  acetaminophen     Tablet .. 650 milliGRAM(s) Oral every 6 hours PRN Mild Pain (1 - 3)  dextrose Oral Gel 15 Gram(s) Oral once PRN Blood Glucose LESS THAN 70 milliGRAM(s)/deciliter  morphine  - Injectable 2 milliGRAM(s) IV Push every 4 hours PRN Moderate Pain (4 - 6)      PHYSICAL EXAM:  VITALS: T(C): 35.8 (10-21-22 @ 12:00)  T(F): 96.5 (10-21-22 @ 12:00), Max: 98.6 (10-21-22 @ 04:00)  HR: 87 (10-21-22 @ 16:00) (69 - 101)  BP: 125/94 (10-21-22 @ 16:00) (102/64 - 138/99)  RR:  (10 - 21)  SpO2:  (93% - 100%)  Wt(kg): --  General: Well-developed male, No acute distress, Speaking full sentences.   Eye:  Extraocular movements are intact, No proptosis or lid lag, No scleral icterus.   Respiratory:  Respirations are non-labored, Symmetric chest wall expansion.  Cardiovascular:  Normal rate, Regular rhythm, No edema.  Gastrointestinal:  Soft, Non-tender, Non-distended.   Integumentary:  Warm, dry.    POCT Blood Glucose.: 256 mg/dL (10-21-22 @ 12:21)  POCT Blood Glucose.: 139 mg/dL (10-21-22 @ 10:26)  POCT Blood Glucose.: 145 mg/dL (10-21-22 @ 06:12)  POCT Blood Glucose.: 136 mg/dL (10-21-22 @ 04:06)  POCT Blood Glucose.: 139 mg/dL (10-21-22 @ 02:09)  POCT Blood Glucose.: 141 mg/dL (10-20-22 @ 23:29)  POCT Blood Glucose.: 144 mg/dL (10-20-22 @ 21:55)  POCT Blood Glucose.: 144 mg/dL (10-20-22 @ 20:30)  POCT Blood Glucose.: 139 mg/dL (10-20-22 @ 18:41)  POCT Blood Glucose.: 148 mg/dL (10-20-22 @ 18:01)  POCT Blood Glucose.: 135 mg/dL (10-20-22 @ 17:20)  POCT Blood Glucose.: 153 mg/dL (10-20-22 @ 16:09)  POCT Blood Glucose.: 153 mg/dL (10-20-22 @ 15:35)  POCT Blood Glucose.: 149 mg/dL (10-20-22 @ 14:07)  POCT Blood Glucose.: 150 mg/dL (10-20-22 @ 13:08)  POCT Blood Glucose.: 155 mg/dL (10-20-22 @ 12:14)  POCT Blood Glucose.: 164 mg/dL (10-20-22 @ 11:25)  POCT Blood Glucose.: 150 mg/dL (10-20-22 @ 10:27)  POCT Blood Glucose.: 155 mg/dL (10-20-22 @ 08:52)  POCT Blood Glucose.: 122 mg/dL (10-20-22 @ 07:45)  POCT Blood Glucose.: 114 mg/dL (10-20-22 @ 06:55)  POCT Blood Glucose.: 139 mg/dL (10-20-22 @ 06:15)  POCT Blood Glucose.: 115 mg/dL (10-20-22 @ 05:10)  POCT Blood Glucose.: 95 mg/dL (10-20-22 @ 04:19)  POCT Blood Glucose.: 113 mg/dL (10-20-22 @ 03:03)  POCT Blood Glucose.: 91 mg/dL (10-20-22 @ 02:09)  POCT Blood Glucose.: 104 mg/dL (10-20-22 @ 01:11)  POCT Blood Glucose.: 106 mg/dL (10-19-22 @ 23:57)  POCT Blood Glucose.: 125 mg/dL (10-19-22 @ 23:16)  POCT Blood Glucose.: 120 mg/dL (10-19-22 @ 22:18)  POCT Blood Glucose.: 155 mg/dL (10-19-22 @ 21:20)  POCT Blood Glucose.: 128 mg/dL (10-19-22 @ 20:41)  POCT Blood Glucose.: 146 mg/dL (10-19-22 @ 19:51)  POCT Blood Glucose.: 180 mg/dL (10-19-22 @ 18:20)  POCT Blood Glucose.: 156 mg/dL (10-19-22 @ 17:17)  POCT Blood Glucose.: 153 mg/dL (10-19-22 @ 16:16)  POCT Blood Glucose.: 233 mg/dL (10-19-22 @ 14:58)  POCT Blood Glucose.: 191 mg/dL (10-19-22 @ 13:51)  POCT Blood Glucose.: 207 mg/dL (10-19-22 @ 13:00)  POCT Blood Glucose.: 216 mg/dL (10-19-22 @ 12:09)  POCT Blood Glucose.: 220 mg/dL (10-19-22 @ 11:34)  POCT Blood Glucose.: 278 mg/dL (10-19-22 @ 10:12)  POCT Blood Glucose.: 348 mg/dL (10-19-22 @ 07:33)      10-20    134<L>  |  100  |  5<L>  ----------------------------<  142<H>  3.7   |  22  |  0.68    eGFR: 121    Ca    9.0      10-20  Mg     1.70     10-20  Phos  2.9     10-20    TPro  6.4  /  Alb  3.2<L>  /  TBili  0.5  /  DBili  x   /  AST  15  /  ALT  20  /  AlkPhos  67  10-20          Thyroid Function Tests:  10-20 @ 02:15 TSH 0.70 FreeT4 -- T3 -- Anti TPO -- Anti Thyroglobulin Ab -- TSI --

## 2022-10-21 NOTE — DISCHARGE NOTE PROVIDER - HOSPITAL COURSE
42M HTN (diet-controlled) presenting w/ abdominal pain associated w/ N/V in the context of  polydipsia, polyuria and unintentional weight loss. Found to have mild DKA in the setting of acute hypertriglyceridemia-induced acute pancreatitis. CT scan negative for malignancy    Diabetes mellitus, new onset  - Appreciate endocrinology recs  - stable for d/c on basal/bolus insulin  - outpt endo f/u  - stable for d/c home    Hypertension  - Continue lisinopril    Pancreatitis  - Due to hypertriglyceridemia in setting of hyperglycemia/DKA  - Asymtomatic, TG much lower  - continue statin, fibrate, Omega-3    Discussed case with Dr. Latham on 10/22/2022, patient medically stable for discharge to home today.

## 2022-10-21 NOTE — CHART NOTE - NSCHARTNOTEFT_GEN_A_CORE
MICU Transfer Note    Transfer from: MICU  Transfer to:  ( x ) Medicine    (  ) Telemetry    (  ) RCU    (  ) Palliative    (  ) Stroke Unit    (  ) _______________  Accepting physician:      This is a 42-year-old male w/ pmh of HTN (diet-controlled as per patient) who presented to the ER on 10/19  w/ abdominal pain associated w/ N/V x1 day and   polydipsia, polyuria and unintentional weight loss for the past 1-2 months. Found to have mild DKA in the setting of acute hypertriglyceridemia-induced acute pancreatitis. CT scan negative for malignancy now being w/u for autoimmune pathology.       NEURO  A/Ox4 at baseline, no acute issues, initially presented with abdominal pain, now resolved   - morphine 4 mg IV q4 PRN, has not required is okay with Tylenol prn     PULMONARY  - No active issues  -on room air     CARDIOVASCULAR  Hx of HTN and HLD  -c/w Lisinopril 10mg PO daily  -c/w atorvastatin   - Lipid Profile: Total Cholesterol 408, Triglycerides 2025, HDL 18 mg/dL, Non HDL cholesterol 390   - f/u Lipoprotein A and Apoliprotein B   - f/u TTE      GASTROINTESTINAL  Acute pancreatitis 2/2 to hypertriglyceridemia   - Abdominal pain + Lipase  - CT a/p 10/19 no evidence of necrosis, abscess, or fluid collection   -now tolerating diet  - BISAP score 0, Ritesh: 1  - ionized calcium - 1.08    ENDOCRINE  Newly diagnosed DM a1c 12% presented with mild DKA/hypertriglyceridemia in the setting of acute pancreatitis pH: 7.24, Bicarb 22, , + Ketones, BHB 4.4, required insulin gtt now off since 10/20   - 10/19: Triglycerides 2339 on admission  -c/w  Fenofibrate 145mg PO daily and Lovaza 4mg PO daily  transitioned off insulin gtt, now on sliding scale, before meal coverage and lantus   - Endocrinology following   - f/u Zinc transporter 8 ab  - Will require outpatient follow with Dr. Calderon (Lipidologist)   - f/u Glutamic acid decarboxylase abs, zinc transporter 8 abs, islet cell ab, and IA-2 ab (to r/o T1DM/RYAN)    INFECTIOUS DISEASE  No s/s of infection now afebrile   - UA w/o evidence of infection   - CXR w/o evidence of infection  - febrile 10/19, f/u Bcx with NGTD  - RVP negative  - COVID negative      RENAL  No ISIS   -voiding without any issues    HEMATOLOGY/ONCOLOGY  - No active issues  - No evidence of pancreatic mass/necrosis/fluid on CT A/P; read c/w acute pancreatitis    DVT/PE ppx:  -c/w Lovenox 40mg     Ethics/Dispo:  -full code  -made eligible to medicine floors vs possible D/C home                   For Follow-Up:  - pt is newly diagnosed diabetic f/u with endocrine for Basal/bolus versus basal/orals for D/C   -Please d/c with supplies: insulin pens, pen needles, glucometer, test strips, lancets, and alcohol pads   -needs to f/u with Endocrine Practice at [address] [place] [address], [address], [place] [address]; Ph # [phone] after discharge   -needs to follow up outpatient with Dr. Calderon (Lipidologist)       Vital Signs Last 24 Hrs  T(C): 35.8 (21 Oct 2022 12:00), Max: 37.1 (20 Oct 2022 16:00)  T(F): 96.5 (21 Oct 2022 12:00), Max: 98.7 (20 Oct 2022 16:00)  HR: 81 (21 Oct 2022 14:00) (69 - 101)  BP: 129/71 (21 Oct 2022 14:00) (102/64 - 138/99)  BP(mean): 87 (21 Oct 2022 14:00) (70 - 110)  RR: 17 (21 Oct 2022 14:00) (10 - 25)  SpO2: 98% (21 Oct 2022 14:00) (93% - 100%)    Parameters below as of 21 Oct 2022 12:00  Patient On (Oxygen Delivery Method): room air      I&O's Summary    20 Oct 2022 07:01  -  21 Oct 2022 07:00  --------------------------------------------------------  IN: 3471 mL / OUT: 3850 mL / NET: -379 mL    21 Oct 2022 07:01  -  21 Oct 2022 15:26  --------------------------------------------------------  IN: 0 mL / OUT: 1050 mL / NET: -1050 mL                  MEDICATIONS  (STANDING):  atorvastatin 40 milliGRAM(s) Oral at bedtime  chlorhexidine 2% Cloths 1 Application(s) Topical <User Schedule>  coronavirus bivalent (EUA) Booster Vaccine (PFIZER) 0.3 milliLiter(s) IntraMuscular once  dextrose 5%. 1000 milliLiter(s) (50 mL/Hr) IV Continuous <Continuous>  dextrose 5%. 1000 milliLiter(s) (100 mL/Hr) IV Continuous <Continuous>  dextrose 50% Injectable 25 Gram(s) IV Push once  dextrose 50% Injectable 12.5 Gram(s) IV Push once  dextrose 50% Injectable 25 Gram(s) IV Push once  enoxaparin Injectable 40 milliGRAM(s) SubCutaneous every 24 hours  fenofibrate Tablet 145 milliGRAM(s) Oral daily  glucagon  Injectable 1 milliGRAM(s) IntraMuscular once  influenza   Vaccine 0.5 milliLiter(s) IntraMuscular once  insulin glargine Injectable (LANTUS) 18 Unit(s) SubCutaneous at bedtime  insulin lispro (ADMELOG) corrective regimen sliding scale   SubCutaneous three times a day before meals  insulin lispro (ADMELOG) corrective regimen sliding scale   SubCutaneous at bedtime  insulin lispro Injectable (ADMELOG) 6 Unit(s) SubCutaneous three times a day before meals  lisinopril 10 milliGRAM(s) Oral daily  omega-3-Acid Ethyl Esters 4 Gram(s) Oral daily    MEDICATIONS  (PRN):  acetaminophen     Tablet .. 650 milliGRAM(s) Oral every 6 hours PRN Mild Pain (1 - 3)  dextrose Oral Gel 15 Gram(s) Oral once PRN Blood Glucose LESS THAN 70 milliGRAM(s)/deciliter  morphine  - Injectable 2 milliGRAM(s) IV Push every 4 hours PRN Moderate Pain (4 - 6)        LABS                                            12.7                  Neurophils% (auto):   67.3   (10-20 @ 23:50):    9.88 )-----------(194          Lymphocytes% (auto):  26.2                                          36.4                   Eosinphils% (auto):   1.1      Manual%: Neutrophils x    ; Lymphocytes x    ; Eosinophils x    ; Bands%: x    ; Blasts x                                    134    |  100    |  5                   Calcium: 9.0   / iCa: x      (10-20 @ 23:50)    ----------------------------<  142       Magnesium: 1.70                             3.7     |  22     |  0.68             Phosphorous: 2.9      TPro  6.4    /  Alb  3.2    /  TBili  0.5    /  DBili  x      /  AST  15     /  ALT  20     /  AlkPhos  67     20 Oct 2022 23:50 MICU Transfer Note    Transfer from: MICU  Transfer to:  ( x ) Medicine    (  ) Telemetry    (  ) RCU    (  ) Palliative    (  ) Stroke Unit    (  ) _______________  Accepting physician: Dr. Morgan  Sign Out: SANDRA Almonte       This is a 42-year-old male w/ pmh of HTN (diet-controlled as per patient) who presented to the ER on 10/19  w/ abdominal pain associated w/ N/V x1 day and   polydipsia, polyuria and unintentional weight loss for the past 1-2 months. Found to have mild DKA in the setting of acute hypertriglyceridemia-induced acute pancreatitis. CT scan negative for malignancy now being w/u for autoimmune pathology.       NEURO  A/Ox4 at baseline, no acute issues, initially presented with abdominal pain, now resolved   - morphine 4 mg IV q4 PRN, has not required is okay with Tylenol prn     PULMONARY  - No active issues  -on room air     CARDIOVASCULAR  Hx of HTN and HLD  -c/w Lisinopril 10mg PO daily  -c/w atorvastatin   - Lipid Profile: Total Cholesterol 408, Triglycerides 2025, HDL 18 mg/dL, Non HDL cholesterol 390   - f/u Lipoprotein A and Apoliprotein B   - f/u TTE      GASTROINTESTINAL  Acute pancreatitis 2/2 to hypertriglyceridemia   - Abdominal pain + Lipase  - CT a/p 10/19 no evidence of necrosis, abscess, or fluid collection   -now tolerating diet  - BISAP score 0, Hood: 1  - ionized calcium - 1.08    ENDOCRINE  Newly diagnosed DM a1c 12% presented with mild DKA/hypertriglyceridemia in the setting of acute pancreatitis pH: 7.24, Bicarb 22, , + Ketones, BHB 4.4, required insulin gtt now off since 10/20   - 10/19: Triglycerides 2339 on admission  -c/w  Fenofibrate 145mg PO daily and Lovaza 4mg PO daily  transitioned off insulin gtt, now on sliding scale, before meal coverage and lantus   - Endocrinology following   - f/u Zinc transporter 8 ab  - Will require outpatient follow with Dr. Calderon (Lipidologist)   - f/u Glutamic acid decarboxylase abs, zinc transporter 8 abs, islet cell ab, and IA-2 ab (to r/o T1DM/RYAN)    INFECTIOUS DISEASE  No s/s of infection now afebrile   - UA w/o evidence of infection   - CXR w/o evidence of infection  - febrile 10/19, f/u Bcx with NGTD  - RVP negative  - COVID negative      RENAL  No ISIS   -voiding without any issues    HEMATOLOGY/ONCOLOGY  - No active issues  - No evidence of pancreatic mass/necrosis/fluid on CT A/P; read c/w acute pancreatitis    DVT/PE ppx:  -c/w Lovenox 40mg     Ethics/Dispo:  -full code  -made eligible to medicine floors vs possible D/C home                   For Follow-Up:  - pt is newly diagnosed diabetic f/u with endocrine for Basal/bolus versus basal/orals for D/C   -Please d/c with supplies: insulin pens, pen needles, glucometer, test strips, lancets, and alcohol pads   -needs to f/u with Endocrine Practice at [address] [place] [address], [address], [place] [address];  # [phone] after discharge   -needs to follow up outpatient with Dr. Calderon (Lipidologist)       Vital Signs Last 24 Hrs  T(C): 35.8 (21 Oct 2022 12:00), Max: 37.1 (20 Oct 2022 16:00)  T(F): 96.5 (21 Oct 2022 12:00), Max: 98.7 (20 Oct 2022 16:00)  HR: 81 (21 Oct 2022 14:00) (69 - 101)  BP: 129/71 (21 Oct 2022 14:00) (102/64 - 138/99)  BP(mean): 87 (21 Oct 2022 14:00) (70 - 110)  RR: 17 (21 Oct 2022 14:00) (10 - 25)  SpO2: 98% (21 Oct 2022 14:00) (93% - 100%)    Parameters below as of 21 Oct 2022 12:00  Patient On (Oxygen Delivery Method): room air      I&O's Summary    20 Oct 2022 07:01  -  21 Oct 2022 07:00  --------------------------------------------------------  IN: 3471 mL / OUT: 3850 mL / NET: -379 mL    21 Oct 2022 07:01  -  21 Oct 2022 15:26  --------------------------------------------------------  IN: 0 mL / OUT: 1050 mL / NET: -1050 mL                  MEDICATIONS  (STANDING):  atorvastatin 40 milliGRAM(s) Oral at bedtime  chlorhexidine 2% Cloths 1 Application(s) Topical <User Schedule>  coronavirus bivalent (EUA) Booster Vaccine (PFIZER) 0.3 milliLiter(s) IntraMuscular once  dextrose 5%. 1000 milliLiter(s) (50 mL/Hr) IV Continuous <Continuous>  dextrose 5%. 1000 milliLiter(s) (100 mL/Hr) IV Continuous <Continuous>  dextrose 50% Injectable 25 Gram(s) IV Push once  dextrose 50% Injectable 12.5 Gram(s) IV Push once  dextrose 50% Injectable 25 Gram(s) IV Push once  enoxaparin Injectable 40 milliGRAM(s) SubCutaneous every 24 hours  fenofibrate Tablet 145 milliGRAM(s) Oral daily  glucagon  Injectable 1 milliGRAM(s) IntraMuscular once  influenza   Vaccine 0.5 milliLiter(s) IntraMuscular once  insulin glargine Injectable (LANTUS) 18 Unit(s) SubCutaneous at bedtime  insulin lispro (ADMELOG) corrective regimen sliding scale   SubCutaneous three times a day before meals  insulin lispro (ADMELOG) corrective regimen sliding scale   SubCutaneous at bedtime  insulin lispro Injectable (ADMELOG) 6 Unit(s) SubCutaneous three times a day before meals  lisinopril 10 milliGRAM(s) Oral daily  omega-3-Acid Ethyl Esters 4 Gram(s) Oral daily    MEDICATIONS  (PRN):  acetaminophen     Tablet .. 650 milliGRAM(s) Oral every 6 hours PRN Mild Pain (1 - 3)  dextrose Oral Gel 15 Gram(s) Oral once PRN Blood Glucose LESS THAN 70 milliGRAM(s)/deciliter  morphine  - Injectable 2 milliGRAM(s) IV Push every 4 hours PRN Moderate Pain (4 - 6)        LABS                                            12.7                  Neurophils% (auto):   67.3   (10-20 @ 23:50):    9.88 )-----------(194          Lymphocytes% (auto):  26.2                                          36.4                   Eosinphils% (auto):   1.1      Manual%: Neutrophils x    ; Lymphocytes x    ; Eosinophils x    ; Bands%: x    ; Blasts x                                    134    |  100    |  5                   Calcium: 9.0   / iCa: x      (10-20 @ 23:50)    ----------------------------<  142       Magnesium: 1.70                             3.7     |  22     |  0.68             Phosphorous: 2.9      TPro  6.4    /  Alb  3.2    /  TBili  0.5    /  DBili  x      /  AST  15     /  ALT  20     /  AlkPhos  67     20 Oct 2022 23:50

## 2022-10-21 NOTE — DISCHARGE NOTE PROVIDER - CARE PROVIDER_API CALL
Angel Calderon)  Cardiovascular Disease; Internal Medicine  [address] [place] [address]  Phone: [phone]  Fax: [phone]  Follow Up Time: 1 month   Angel Calderon)  Cardiovascular Disease; Internal Medicine  [address] [place] [address]  Phone: [phone]  Fax: [phone]  Follow Up Time: 1 month    Endocrinology,   -Endocrine Practice at [address] [place] [address], [address] [address]; Ph # [phone]  Phone: (   )    -  Fax: (   )    -  Follow Up Time: 2 weeks

## 2022-10-21 NOTE — DISCHARGE NOTE PROVIDER - NSDCMRMEDTOKEN_GEN_ALL_CORE_FT
acetaminophen 325 mg oral tablet: 2 tab(s) orally every 6 hours, As needed, Mild Pain (1 - 3)  alcohol swabs : Apply topically to affected area 4 times a day  ***PRICE CHECK, PLEASE EXPEDITE for benigno PAGER [phone]***   atorvastatin 40 mg oral tablet: 1 tab(s) orally once a day (at bedtime) ***PRICE CHECK, PLEASE EXPEDITE for benigno PAGER [phone]***  fenofibrate 145 mg oral tablet: 1 tab(s) orally once a day ***PRICE CHECK, PLEASE EXPEDITE for dispwilliam PAGER [phone]***  glucometer (per patient&#x27;s insurance): Test blood sugars four times a day. Dispense #1 glucometer.  ***PRICE CHECK, PLEASE EXPEDITE for benigno PAGER [phone]***  HumaLOG KwikPen 100 units/mL injectable solution: 6 unit(s) subcutaneous 3 times a day (with meals)  ***PRICE CHECK, PLEASE EXPEDITE for benigno PAGER [phone]***   Insulin Pen Needles, 4mm: 1 application subcutaneously 4 times a day. ** Use with insulin pen **  ***PRICE CHECK, PLEASE EXPEDITE for benigno PAGER [phone]***   lancets: 1 application subcutaneously 4 times a day  ***PRICE CHECK, PLEASE EXPEDITE for benigno PAGER [phone]***   lisinopril 10 mg oral tablet: 1 tab(s) orally once a day  ***PRICE CHECK, PLEASE EXPEDITE for benigno PAGER [phone]***  omega-3 polyunsaturated fatty acids ethyl esters 1000 mg oral capsule: 4 cap(s) orally once a day ***PRICE CHECK, PLEASE EXPEDITE for dispwilliam PAGER [phone]***  Semglee Prefilled Pen 100 units/mL subcutaneous solution: 18 unit(s) subcutaneous once a day (at bedtime)   test strips (per patient&#x27;s insurance): 1 application subcutaneously 4 times a day. ** Compatible with patient&#x27;s glucometer **  ***PRICE CHECK, PLEASE EXPEDITE for benigno PAGER [phone]***

## 2022-10-21 NOTE — DISCHARGE NOTE PROVIDER - CARE PROVIDERS DIRECT ADDRESSES
,[email or web address],DirectAddress_Unknown

## 2022-10-21 NOTE — PROGRESS NOTE ADULT - NS ATTEND AMEND GEN_ALL_CORE FT
42 year old male admitted with hypertriglyceridemia acute pancreatitis and DKA. Started on an insulin gtt. Triglyceride levels are improved and gap is closed. Transitioned to subcutaneous insulin. Start diet. Follow up with endocrinology. Eligible for transfer to floors.

## 2022-10-21 NOTE — PROGRESS NOTE ADULT - ASSESSMENT
42M with HTN presenting with abdominal pain, found to have HTG pancreatitis and DKA    #New onset DM (T1DM vs. KPD) with DKA with Hypertriglyceridemia induced pancreatitis   A1c 12%  pH 7.28, BHB 4.4, CO2 19, AG 17.   TG 2339  DKA and uncontrolled DM are both potential triggers for hyperTG induced pancreatitis. Improvement in DKA will improve TG levels   Patient with concern for pancreatitis and hypocalcemia on admission, made NPO, started insulin gtt, and supportive measures with fluids.    -Insulin gtt per ICU protocol stopped last night (10/20-21)  -DKA now resolved, repeat TG level 435. On basal/bolus insulin. Continue Lantus 18 units qhs and do not hold if NPO. Use Admelog 6 units TIDac (HOLD IF NPO). Use low dose Admelog correction scale qac and separate low dose Admelog correction scale qhs.  -Check Ab to r/o T1DM/RYAN - Glutamic Acid Decarboxylase, Zinc Transporter 8, Islet Cell Ab, and IA-2 Ab  -check cpeptide once glucotoxicity resolves   -Was started on Atorvastatin 40 mg daily, Fenofibrate 145 mg daily, and Lovaza 4g daily  -Nutrition consult    For Discharge:   Please send Lantus solostar pen and Humalog Kwikpen as test scripts to check insurance coverage.  Okay to send with current doses and update prior to d/c. "Lantus Solostar Pen 18 units before bedtime, dispense 18mL." If not covered, can try alternating with one of following: tresiba/basaglar/toujeo/Levemir. "Humalog Kwikpen up to 6 units before meals, dispense 18mL." Can try alternating with one of following if not covered: novolog/apidra/admelog/fiasp  -Patient will need education on how to self-administer insulin, how to check FSBG, as well as hypoglycemia management. Patient to call doctor with persistent high or low BG at home.   -Will need to have glucometer (ACCU_CHECK Stacey Connect, Ascensia Contour Next EZ or One, Freestyle [place] LITE or OneTouch Verio IQ), test strips and lancets (make sure compatible with glucometer, dispense #100 (or #200) and use as directed) sent to pharmacy prior to discharge.  -Will also need [employer] 4mm pen needles  -Recommend outpatient dilated eye exam and endocrinology f/u outpatient.   -Endocrine Practice at [address] [place] [address], [address], [place] [address]; Ph # [phone]    #HLD  Was started on Atorvastatin 40 mg daily, Fenofibrate 145 mg daily, and Lovaza 4g daily    #HTN  BP Goal < 130/80  Continue management per primary team    Kam Batista DO, Endocrinology Fellow  For follow-up questions, discharge recommendations, or new consults please call answering service at [phone] (weekdays), [phone] (nights/weekends). For nonurgent matters, please email [email or web address] or [email or web address]. 42M with HTN presenting with abdominal pain, found to have HTG pancreatitis and DKA    #New onset DM (T1DM vs. KPD) with DKA with Hypertriglyceridemia induced pancreatitis   A1c 12%  pH 7.28, BHB 4.4, CO2 19, AG 17.   TG 2339  DKA and uncontrolled DM are both potential triggers for hyperTG induced pancreatitis. Improvement in DKA will improve TG levels   Patient with concern for pancreatitis and hypocalcemia on admission, made NPO, started insulin gtt, and supportive measures with fluids.    -Insulin gtt per ICU protocol stopped last night (10/20-21)  -DKA now resolved, repeat TG level 435. On basal/bolus insulin. Continue Lantus 18 units qhs and do not hold if NPO. Use Admelog 6 units TIDac (HOLD IF NPO). Use low dose Admelog correction scale qac and separate low dose Admelog correction scale qhs.  -Check Ab to r/o T1DM/RAYN - Glutamic Acid Decarboxylase, Zinc Transporter 8, Islet Cell Ab, and IA-2 Ab  -cpeptide low normal at 1.2, though likely glucotoxic   -Was started on Atorvastatin 40 mg daily, Fenofibrate 145 mg daily, and Lovaza 4g daily  -Nutrition consult    For Discharge:   Plan for basal/bolus insulin on discharge.  Please send Lantus solostar pen and Humalog Kwikpen as test scripts to check insurance coverage.  Okay to send with current doses and update prior to d/c. "Lantus Solostar Pen 18 units before bedtime, dispense 18mL." If not covered, can try alternating with one of following: tresiba/basaglar/toujeo/Levemir. "Humalog Kwikpen up to 6 units before meals, dispense 18mL." Can try alternating with one of following if not covered: novolog/apidra/admelog/fiasp  -Patient will need education on how to self-administer insulin, how to check FSBG, as well as hypoglycemia management. Patient to call doctor with persistent high or low BG at home.   -Will need to have glucometer (ACCU_CHECK Stacey Connect, Ascensia Contour Next EZ or One, Freestyle [place] LITE or OneTouch Verio IQ), test strips and lancets (make sure compatible with glucometer, dispense #100 (or #200) and use as directed) sent to pharmacy prior to discharge.  -Will also need BD diane 4mm pen needles  -Recommend outpatient dilated eye exam and endocrinology f/u outpatient.   -Endocrine Practice at [address] [place] [address], [address], [place] [address]; Ph # [phone]    #HLD  Was started on Atorvastatin 40 mg daily, Fenofibrate 145 mg daily, and Lovaza 4g daily    #HTN  BP Goal < 130/80  Continue management per primary team    Kam Baitsta DO, Endocrinology Fellow  For follow-up questions, discharge recommendations, or new consults please call answering service at [phone] (weekdays), [phone] (nights/weekends). For nonurgent matters, please email [email or web address] or [email or web address]. 42M with HTN presenting with abdominal pain, found to have HTG pancreatitis and DKA    #New onset DM (T1DM vs. KPD) with DKA with Hypertriglyceridemia induced pancreatitis   A1c 12%  pH 7.28, BHB 4.4, CO2 19, AG 17.   TG 2339  DKA and uncontrolled DM are both potential triggers for hyperTG induced pancreatitis. Improvement in DKA will improve TG levels   Patient with concern for pancreatitis and hypocalcemia on admission, made NPO, started insulin gtt, and supportive measures with fluids.    -Insulin gtt per ICU protocol stopped last night (10/20-21)  -DKA now resolved, repeat TG level 435. On basal/bolus insulin. Continue Lantus 18 units qhs and do not hold if NPO. Use Admelog 6 units TIDac (HOLD IF NPO). Use low dose Admelog correction scale qac and separate low dose Admelog correction scale qhs.  -Check Ab to r/o T1DM/RYAN - Glutamic Acid Decarboxylase, Zinc Transporter 8, Islet Cell Ab, and IA-2 Ab  -cpeptide low normal at 1.2, though likely glucotoxic   -Was started on Atorvastatin 40 mg daily, Fenofibrate 145 mg daily, and Lovaza 4g daily  -Nutrition consult    For Discharge:   Plan for basal/bolus insulin on discharge.  Please send Lantus solostar pen and Humalog Kwikpen as test scripts to check insurance coverage.  Okay to send with current doses and update prior to d/c. "Lantus Solostar Pen 18 units before bedtime, dispense 18mL." If not covered, can try alternating with one of following: tresiba/basaglar/toujeo/Levemir. "Humalog Kwikpen up to 6 units before meals, dispense 18mL." Can try alternating with one of following if not covered: novolog/apidra/admelog/fiasp  -Patient will need education on how to self-administer insulin, how to check FSBG, as well as hypoglycemia management. Patient to call doctor with persistent high or low BG at home.   -Will need to have glucometer (ACCU_CHECK Stacey Connect, Ascensia Contour Next EZ or One, Freestyle [place] LITE or OneTouch Verio IQ), test strips and lancets (make sure compatible with glucometer, dispense #100 (or #200) and use as directed) sent to pharmacy prior to discharge.  -Will also need BD diane 4mm pen needles  -Recommend outpatient dilated eye exam and endocrinology f/u outpatient.   -Endocrine Practice at [address] [place] [address], [address], [place] [address];  # [phone]    #HLD  Was started on Atorvastatin 40 mg daily, Fenofibrate 145 mg daily, and Lovaza 4g daily. Plan to continue at discharge. Would benefit from lipidologist as outpatient.    #HTN  BP Goal < 130/80  Continue management per primary team    Kam Batista DO, Endocrinology Fellow  For follow-up questions, discharge recommendations, or new consults please call answering service at [phone] (weekdays), [phone] (nights/weekends). For nonurgent matters, please email [email or web address] or [email or web address].

## 2022-10-22 ENCOUNTER — TRANSCRIPTION ENCOUNTER (OUTPATIENT)
Age: 42
End: 2022-10-22

## 2022-10-22 VITALS
SYSTOLIC BLOOD PRESSURE: 135 MMHG | TEMPERATURE: 98 F | DIASTOLIC BLOOD PRESSURE: 83 MMHG | RESPIRATION RATE: 18 BRPM | HEART RATE: 82 BPM | OXYGEN SATURATION: 98 %

## 2022-10-22 LAB
ALBUMIN SERPL ELPH-MCNC: 3.6 G/DL — SIGNIFICANT CHANGE UP (ref 3.3–5)
ALP SERPL-CCNC: 73 U/L — SIGNIFICANT CHANGE UP (ref 40–120)
ALT FLD-CCNC: 17 U/L — SIGNIFICANT CHANGE UP (ref 4–41)
ANION GAP SERPL CALC-SCNC: 11 MMOL/L — SIGNIFICANT CHANGE UP (ref 7–14)
AST SERPL-CCNC: 18 U/L — SIGNIFICANT CHANGE UP (ref 4–40)
B-OH-BUTYR SERPL-SCNC: 0.5 MMOL/L — HIGH (ref 0–0.4)
BASOPHILS # BLD AUTO: 0.04 K/UL — SIGNIFICANT CHANGE UP (ref 0–0.2)
BASOPHILS NFR BLD AUTO: 0.5 % — SIGNIFICANT CHANGE UP (ref 0–2)
BILIRUB SERPL-MCNC: 0.2 MG/DL — SIGNIFICANT CHANGE UP (ref 0.2–1.2)
BUN SERPL-MCNC: 16 MG/DL — SIGNIFICANT CHANGE UP (ref 7–23)
CALCIUM SERPL-MCNC: 9.5 MG/DL — SIGNIFICANT CHANGE UP (ref 8.4–10.5)
CHLORIDE SERPL-SCNC: 101 MMOL/L — SIGNIFICANT CHANGE UP (ref 98–107)
CO2 SERPL-SCNC: 23 MMOL/L — SIGNIFICANT CHANGE UP (ref 22–31)
CREAT SERPL-MCNC: 0.72 MG/DL — SIGNIFICANT CHANGE UP (ref 0.5–1.3)
EGFR: 118 ML/MIN/1.73M2 — SIGNIFICANT CHANGE UP
EOSINOPHIL # BLD AUTO: 0.23 K/UL — SIGNIFICANT CHANGE UP (ref 0–0.5)
EOSINOPHIL NFR BLD AUTO: 2.9 % — SIGNIFICANT CHANGE UP (ref 0–6)
GLUCOSE BLDC GLUCOMTR-MCNC: 174 MG/DL — HIGH (ref 70–99)
GLUCOSE BLDC GLUCOMTR-MCNC: 175 MG/DL — HIGH (ref 70–99)
GLUCOSE SERPL-MCNC: 190 MG/DL — HIGH (ref 70–99)
HCT VFR BLD CALC: 39.2 % — SIGNIFICANT CHANGE UP (ref 39–50)
HGB BLD-MCNC: 13.2 G/DL — SIGNIFICANT CHANGE UP (ref 13–17)
IANC: 4.29 K/UL — SIGNIFICANT CHANGE UP (ref 1.8–7.4)
IMM GRANULOCYTES NFR BLD AUTO: 0.4 % — SIGNIFICANT CHANGE UP (ref 0–0.9)
ISLET CELL512 AB SER-ACNC: SIGNIFICANT CHANGE UP
LYMPHOCYTES # BLD AUTO: 2.83 K/UL — SIGNIFICANT CHANGE UP (ref 1–3.3)
LYMPHOCYTES # BLD AUTO: 35.9 % — SIGNIFICANT CHANGE UP (ref 13–44)
MCHC RBC-ENTMCNC: 28.8 PG — SIGNIFICANT CHANGE UP (ref 27–34)
MCHC RBC-ENTMCNC: 33.7 GM/DL — SIGNIFICANT CHANGE UP (ref 32–36)
MCV RBC AUTO: 85.4 FL — SIGNIFICANT CHANGE UP (ref 80–100)
MONOCYTES # BLD AUTO: 0.46 K/UL — SIGNIFICANT CHANGE UP (ref 0–0.9)
MONOCYTES NFR BLD AUTO: 5.8 % — SIGNIFICANT CHANGE UP (ref 2–14)
NEUTROPHILS # BLD AUTO: 4.29 K/UL — SIGNIFICANT CHANGE UP (ref 1.8–7.4)
NEUTROPHILS NFR BLD AUTO: 54.5 % — SIGNIFICANT CHANGE UP (ref 43–77)
NRBC # BLD: 0 /100 WBCS — SIGNIFICANT CHANGE UP (ref 0–0)
NRBC # FLD: 0 K/UL — SIGNIFICANT CHANGE UP (ref 0–0)
PLATELET # BLD AUTO: 209 K/UL — SIGNIFICANT CHANGE UP (ref 150–400)
POTASSIUM SERPL-MCNC: 3.9 MMOL/L — SIGNIFICANT CHANGE UP (ref 3.5–5.3)
POTASSIUM SERPL-SCNC: 3.9 MMOL/L — SIGNIFICANT CHANGE UP (ref 3.5–5.3)
PROT SERPL-MCNC: 7.1 G/DL — SIGNIFICANT CHANGE UP (ref 6–8.3)
RBC # BLD: 4.59 M/UL — SIGNIFICANT CHANGE UP (ref 4.2–5.8)
RBC # FLD: 13.1 % — SIGNIFICANT CHANGE UP (ref 10.3–14.5)
SODIUM SERPL-SCNC: 135 MMOL/L — SIGNIFICANT CHANGE UP (ref 135–145)
TRIGL SERPL-MCNC: 801 MG/DL — HIGH
WBC # BLD: 7.88 K/UL — SIGNIFICANT CHANGE UP (ref 3.8–10.5)
WBC # FLD AUTO: 7.88 K/UL — SIGNIFICANT CHANGE UP (ref 3.8–10.5)

## 2022-10-22 PROCEDURE — 99239 HOSP IP/OBS DSCHRG MGMT >30: CPT

## 2022-10-22 RX ORDER — OMEGA-3 ACID ETHYL ESTERS 1 G
4 CAPSULE ORAL
Qty: 120 | Refills: 0
Start: 2022-10-22 | End: 2022-11-20

## 2022-10-22 RX ORDER — LISINOPRIL 2.5 MG/1
1 TABLET ORAL
Qty: 30 | Refills: 0
Start: 2022-10-22 | End: 2022-11-20

## 2022-10-22 RX ORDER — FENOFIBRATE,MICRONIZED 130 MG
1 CAPSULE ORAL
Qty: 30 | Refills: 0
Start: 2022-10-22 | End: 2022-11-20

## 2022-10-22 RX ORDER — ENOXAPARIN SODIUM 100 MG/ML
18 INJECTION SUBCUTANEOUS
Qty: 18 | Refills: 0
Start: 2022-10-22 | End: 2022-11-20

## 2022-10-22 RX ORDER — ATORVASTATIN CALCIUM 80 MG/1
1 TABLET, FILM COATED ORAL
Qty: 30 | Refills: 0
Start: 2022-10-22 | End: 2022-11-20

## 2022-10-22 RX ORDER — ISOPROPYL ALCOHOL, BENZOCAINE .7; .06 ML/ML; ML/ML
1 SWAB TOPICAL
Qty: 100 | Refills: 1
Start: 2022-10-22 | End: 2022-12-10

## 2022-10-22 RX ORDER — INSULIN GLARGINE 100 [IU]/ML
18 INJECTION, SOLUTION SUBCUTANEOUS
Qty: 18 | Refills: 0
Start: 2022-10-22 | End: 2022-11-20

## 2022-10-22 RX ORDER — ACETAMINOPHEN 500 MG
2 TABLET ORAL
Qty: 0 | Refills: 0 | DISCHARGE
Start: 2022-10-22

## 2022-10-22 RX ORDER — INSULIN LISPRO 100/ML
6 VIAL (ML) SUBCUTANEOUS
Qty: 18 | Refills: 0
Start: 2022-10-22 | End: 2022-11-20

## 2022-10-22 RX ADMIN — Medication 145 MILLIGRAM(S): at 12:18

## 2022-10-22 RX ADMIN — Medication 1: at 08:38

## 2022-10-22 RX ADMIN — Medication 6 UNIT(S): at 12:40

## 2022-10-22 RX ADMIN — Medication 1: at 12:41

## 2022-10-22 RX ADMIN — Medication 4 GRAM(S): at 12:18

## 2022-10-22 RX ADMIN — LISINOPRIL 10 MILLIGRAM(S): 2.5 TABLET ORAL at 05:16

## 2022-10-22 RX ADMIN — Medication 6 UNIT(S): at 08:38

## 2022-10-22 NOTE — PROGRESS NOTE ADULT - PROBLEM SELECTOR PLAN 1
Appreciate endocrinology recs  stable for d/c on basal/bolus insulin  outpt endo f/u  stable for d/c home  d/c time 40 min coordinating care

## 2022-10-22 NOTE — PROGRESS NOTE ADULT - REASON FOR ADMISSION
Acute pancreatitis 2/2 hypertriglyceridemia, DKA

## 2022-10-22 NOTE — CHART NOTE - NSCHARTNOTEFT_GEN_A_CORE
MAR Accept Note  Transfer to:  MED  Accepting Attending Physician:  Eddie  Assigned Room: 5S 546    Patient seen and examined.   Labs and data reviewed.   No findings precluding transfer of service.       HPI/MICU COURSE:   Please refer to MICU transfer note for full details. Briefly, this is a 42-year-old male w/ pmh of HTN (diet-controlled as per patient) who presented to the ER on 10/19  w/ abdominal pain associated w/ N/V x1 day and   polydipsia, polyuria and unintentional weight loss for the past 1-2 months. Found to have mild DKA in the setting of acute hypertriglyceridemia-induced acute pancreatitis managed with insulin gtt, now transitioned to basal bolus regimen. CT scan negative for malignancy now being w/u for autoimmune pathology.         FOR FOLLOW-UP:    For Follow-Up:  - pt is newly diagnosed diabetic f/u with endocrine for Basal/bolus versus basal/orals for D/C   -Please d/c with supplies: insulin pens, pen needles, glucometer, test strips, lancets, and alcohol pads   -needs to f/u with Endocrine Practice at [address] [place] [address], [address], [place] [address]; Ph # [phone] after discharge   -needs to follow up outpatient with Dr. Calderon (Lipidologist)     Dilia Choudhury  EM/IM PGY-3  [id number]

## 2022-10-22 NOTE — PROGRESS NOTE ADULT - PROBLEM SELECTOR PLAN 3
Due to hypertriglyceridemia in setting of hyperglycemia/DKA  Asymtomatic, TG much lower  continue statin, fibrate, Omega-3  Outpt endo f/u

## 2022-10-22 NOTE — PROGRESS NOTE ADULT - SUBJECTIVE AND OBJECTIVE BOX
[facility] Division of Hospital Medicine  Adam Latham MD  Pager [phone]    Patient is a 42y old  Male who presents with a chief complaint of Acute pancreatitis 2/2 hypertriglyceridemia, DKA (21 Oct 2022 17:05)      SUBJECTIVE / OVERNIGHT EVENTS: Pt feeling well      MEDICATIONS  (STANDING):  atorvastatin 40 milliGRAM(s) Oral at bedtime  coronavirus bivalent (EUA) Booster Vaccine (PFIZER) 0.3 milliLiter(s) IntraMuscular once  dextrose 5%. 1000 milliLiter(s) (50 mL/Hr) IV Continuous <Continuous>  dextrose 5%. 1000 milliLiter(s) (100 mL/Hr) IV Continuous <Continuous>  dextrose 50% Injectable 25 Gram(s) IV Push once  dextrose 50% Injectable 12.5 Gram(s) IV Push once  dextrose 50% Injectable 25 Gram(s) IV Push once  enoxaparin Injectable 40 milliGRAM(s) SubCutaneous every 24 hours  fenofibrate Tablet 145 milliGRAM(s) Oral daily  glucagon  Injectable 1 milliGRAM(s) IntraMuscular once  influenza   Vaccine 0.5 milliLiter(s) IntraMuscular once  insulin glargine Injectable (LANTUS) 18 Unit(s) SubCutaneous at bedtime  insulin lispro (ADMELOG) corrective regimen sliding scale   SubCutaneous three times a day before meals  insulin lispro (ADMELOG) corrective regimen sliding scale   SubCutaneous at bedtime  insulin lispro Injectable (ADMELOG) 6 Unit(s) SubCutaneous three times a day before meals  lisinopril 10 milliGRAM(s) Oral daily  omega-3-Acid Ethyl Esters 4 Gram(s) Oral daily    MEDICATIONS  (PRN):  acetaminophen     Tablet .. 650 milliGRAM(s) Oral every 6 hours PRN Mild Pain (1 - 3)  dextrose Oral Gel 15 Gram(s) Oral once PRN Blood Glucose LESS THAN 70 milliGRAM(s)/deciliter  morphine  - Injectable 2 milliGRAM(s) IV Push every 4 hours PRN Moderate Pain (4 - 6)      CAPILLARY BLOOD GLUCOSE      POCT Blood Glucose.: 175 mg/dL (22 Oct 2022 12:25)  POCT Blood Glucose.: 174 mg/dL (22 Oct 2022 08:27)  POCT Blood Glucose.: 156 mg/dL (21 Oct 2022 22:06)  POCT Blood Glucose.: 145 mg/dL (21 Oct 2022 17:08)    I&O's Summary    21 Oct 2022 07:01  -  22 Oct 2022 07:00  --------------------------------------------------------  IN: 0 mL / OUT: 1750 mL / NET: -1750 mL        PHYSICAL EXAM:  Vital Signs Last 24 Hrs  T(C): 36.8 (22 Oct 2022 05:00), Max: 36.8 (21 Oct 2022 16:00)  T(F): 98.2 (22 Oct 2022 05:00), Max: 98.3 (21 Oct 2022 16:00)  HR: 73 (22 Oct 2022 05:00) (70 - 93)  BP: 127/87 (22 Oct 2022 05:00) (117/68 - 133/75)  BP(mean): 82 (22 Oct 2022 00:00) (82 - 104)  RR: 18 (22 Oct 2022 05:00) (13 - 18)  SpO2: 97% (22 Oct 2022 05:00) (96% - 100%)    Parameters below as of 22 Oct 2022 05:00  Patient On (Oxygen Delivery Method): room air      CONSTITUTIONAL: NAD  EYES: EOMI, conjunctiva and sclera clear  ENMT: Moist oral mucosa  NECK: Supple  RESPIRATORY: Breathing unlabored, CTAB  CARDIOVASCULAR: S1S2 no MRG  ABDOMEN: Nontender to palpation, normoactive bowel sounds, no rebound/guarding  MUSCULOSKELETAL: no clubbing or cyanosis of digits  NEUROLOGY: No focal deficits   SKIN: No rashes or lesions    LABS:                        13.2   7.88  )-----------( 209      ( 22 Oct 2022 00:45 )             39.2     10-22    135  |  101  |  16  ----------------------------<  190<H>  3.9   |  23  |  0.72    Ca    9.5      22 Oct 2022 00:45  Phos  2.9     10-20  Mg     1.70     10-20    TPro  7.1  /  Alb  3.6  /  TBili  0.2  /  DBili  x   /  AST  18  /  ALT  17  /  AlkPhos  73  10-22              Culture - Blood (collected 19 Oct 2022 23:30)  Source: .Blood Blood-Venous  Preliminary Report (21 Oct 2022 04:01):    No growth to date.    Culture - Blood (collected 19 Oct 2022 22:30)  Source: .Blood Blood-Venous  Preliminary Report (21 Oct 2022 04:01):    No growth to date.      CODE: FULL

## 2022-10-22 NOTE — DISCHARGE NOTE NURSING/CASE MANAGEMENT/SOCIAL WORK - NSDCFUADDAPPT_GEN_ALL_CORE_FT
- Endocrine Practice at [address] [place] [address], [address], [place] [address]; Ph # [phone]

## 2022-10-22 NOTE — DISCHARGE NOTE NURSING/CASE MANAGEMENT/SOCIAL WORK - PATIENT PORTAL LINK FT
You can access the FollowMyHealth Patient Portal offered by [facility] by registering at the following website: [email or web address]. By joining [employer]’s FollowMyHealth portal, you will also be able to view your health information using other applications (apps) compatible with our system.

## 2022-10-22 NOTE — PROGRESS NOTE ADULT - ASSESSMENT
42M HTN (diet-controlled) presenting w/ abdominal pain associated w/ N/V in the context of  polydipsia, polyuria and unintentional weight loss. Found to have mild DKA in the setting of acute hypertriglyceridemia-induced acute pancreatitis. CT scan negative for malignancy

## 2022-10-24 PROBLEM — Z00.00 ENCOUNTER FOR PREVENTIVE HEALTH EXAMINATION: Status: ACTIVE | Noted: 2022-10-24

## 2022-10-25 PROBLEM — I10 ESSENTIAL (PRIMARY) HYPERTENSION: Chronic | Status: ACTIVE | Noted: 2022-10-19

## 2022-10-25 LAB
CULTURE RESULTS: SIGNIFICANT CHANGE UP
CULTURE RESULTS: SIGNIFICANT CHANGE UP
GAD65 AB SER-MCNC: 0.16 NMOL/L — HIGH
ISLET CELL512 AB SER-SCNC: 0 NMOL/L — SIGNIFICANT CHANGE UP
SPECIMEN SOURCE: SIGNIFICANT CHANGE UP
SPECIMEN SOURCE: SIGNIFICANT CHANGE UP

## 2022-10-29 LAB — ZINC TRANSPORTER 8 AB, RESULT: <15 U/ML — SIGNIFICANT CHANGE UP

## 2022-11-02 ENCOUNTER — RESULT CHARGE (OUTPATIENT)
Age: 42
End: 2022-11-02

## 2022-11-02 ENCOUNTER — APPOINTMENT (OUTPATIENT)
Dept: ENDOCRINOLOGY | Facility: CLINIC | Age: 42
End: 2022-11-02

## 2022-11-02 VITALS
OXYGEN SATURATION: 98 % | HEIGHT: 69 IN | SYSTOLIC BLOOD PRESSURE: 124 MMHG | RESPIRATION RATE: 15 BRPM | BODY MASS INDEX: 26.66 KG/M2 | HEART RATE: 68 BPM | WEIGHT: 180 LBS | TEMPERATURE: 98.6 F | DIASTOLIC BLOOD PRESSURE: 72 MMHG

## 2022-11-02 DIAGNOSIS — Z83.3 FAMILY HISTORY OF DIABETES MELLITUS: ICD-10-CM

## 2022-11-02 DIAGNOSIS — Z86.39 PERSONAL HISTORY OF OTHER ENDOCRINE, NUTRITIONAL AND METABOLIC DISEASE: ICD-10-CM

## 2022-11-02 LAB — GLUCOSE BLDC GLUCOMTR-MCNC: 129

## 2022-11-02 PROCEDURE — 99214 OFFICE O/P EST MOD 30 MIN: CPT | Mod: 25

## 2022-11-02 PROCEDURE — 99204 OFFICE O/P NEW MOD 45 MIN: CPT | Mod: 25

## 2022-11-02 PROCEDURE — 95250 CONT GLUC MNTR PHYS/QHP EQP: CPT

## 2022-11-02 RX ORDER — PEN NEEDLE, DIABETIC 32 GX 1/4"
32G X 6 MM NEEDLE, DISPOSABLE MISCELLANEOUS
Qty: 3 | Refills: 1 | Status: ACTIVE | COMMUNITY
Start: 2022-11-02 | End: 1900-01-01

## 2022-11-02 NOTE — DATA REVIEWED
[FreeTextEntry1] : Labs from [facility] reviewed on HIE from recent admission 10/19-10/22 2022

## 2022-11-02 NOTE — HISTORY OF PRESENT ILLNESS
[FreeTextEntry1] : 42 yea rM here for assessment for recently diagnosed Type 2 diabetes mellitus.\par \par Patient was admitted to [facility] with pancreatitis, TGs >2000, was treated with IV insulin, A1c 12%, was discharged on semglee and humalog, as well as lipitor 40mg daily and fenofibrate 145mg daily, scheduled to see Dr Calderon for lipid management. \par \par Self-reported weight loss\par Thirst and frequent urination: yes\par Dry skin: yes\par Vision problems: yes, recently saw optho, no retinopathy reported, given prescription lenses \par Burning pain or tingling in the feet, legs, hands, other areas: no\par High blood pressure: yes\par Extreme hunger: no, recently modified diet, reduced starch intake\par Frequent and/or recurring urinary infections: no \par Skin infections:  no  \par Slow healing of cuts: no \par \par  \par Screening \par Ophthalmology: follows\par \par on ACE-I\par on statin \par \par \par Current diabetic medication regimen (verified with patient): \par \par Semglee 18 units Q pm\par Humalog 6-6-6-0 units \par \par \par SMBG ranges (glucometer): reported\par \par Morning fasting:  <140 avg mg/dl \par pre-meal: <160 avg \par \par \par Hypoglycemia: none reported\par \par \par

## 2022-11-02 NOTE — ASSESSMENT
[Importance of Diet and Exercise] : importance of diet and exercise to improve glycemic control, achieve weight loss and improve cardiovascular health [Hypoglycemia Management] : hypoglycemia management [Action and use of Insulin] : action and use of short and long-acting insulin [Self Monitoring of Blood Glucose] : self monitoring of blood glucose [Weight Loss] : weight loss

## 2022-11-08 ENCOUNTER — APPOINTMENT (OUTPATIENT)
Dept: ENDOCRINOLOGY | Facility: CLINIC | Age: 42
End: 2022-11-08

## 2022-11-08 ENCOUNTER — APPOINTMENT (OUTPATIENT)
Dept: INTERNAL MEDICINE | Facility: CLINIC | Age: 42
End: 2022-11-08
Payer: COMMERCIAL

## 2022-11-08 ENCOUNTER — NON-APPOINTMENT (OUTPATIENT)
Age: 42
End: 2022-11-08

## 2022-11-08 VITALS
BODY MASS INDEX: 27.11 KG/M2 | DIASTOLIC BLOOD PRESSURE: 80 MMHG | HEART RATE: 60 BPM | TEMPERATURE: 98.6 F | HEIGHT: 69 IN | OXYGEN SATURATION: 99 % | WEIGHT: 183 LBS | SYSTOLIC BLOOD PRESSURE: 126 MMHG

## 2022-11-08 DIAGNOSIS — Z13.228 ENCOUNTER FOR SCREENING FOR OTHER METABOLIC DISORDERS: ICD-10-CM

## 2022-11-08 DIAGNOSIS — Z13.6 ENCOUNTER FOR SCREENING FOR CARDIOVASCULAR DISORDERS: ICD-10-CM

## 2022-11-08 DIAGNOSIS — Z78.9 OTHER SPECIFIED HEALTH STATUS: ICD-10-CM

## 2022-11-08 LAB
25(OH)D3 SERPL-MCNC: 18.9 NG/ML
ALBUMIN SERPL ELPH-MCNC: 5 G/DL
ALP BLD-CCNC: 67 U/L
ALT SERPL-CCNC: 21 U/L
ANION GAP SERPL CALC-SCNC: 15 MMOL/L
APPEARANCE: CLEAR
AST SERPL-CCNC: 19 U/L
BACTERIA: NEGATIVE
BASOPHILS # BLD AUTO: 0.06 K/UL
BASOPHILS NFR BLD AUTO: 0.7 %
BILIRUB SERPL-MCNC: 0.4 MG/DL
BILIRUBIN URINE: NEGATIVE
BLOOD URINE: NEGATIVE
BUN SERPL-MCNC: 13 MG/DL
CALCIUM SERPL-MCNC: 10.4 MG/DL
CHLORIDE SERPL-SCNC: 102 MMOL/L
CHOLEST SERPL-MCNC: 152 MG/DL
CK SERPL-CCNC: 107 U/L
CO2 SERPL-SCNC: 23 MMOL/L
COLOR: YELLOW
CREAT SERPL-MCNC: 0.8 MG/DL
CREAT SPEC-SCNC: 250 MG/DL
CREAT/PROT UR: 0.1 RATIO
EGFR: 115 ML/MIN/1.73M2
EOSINOPHIL # BLD AUTO: 0.15 K/UL
EOSINOPHIL NFR BLD AUTO: 1.8 %
ESTIMATED AVERAGE GLUCOSE: 278 MG/DL
FERRITIN SERPL-MCNC: 511 NG/ML
FOLATE SERPL-MCNC: 18.4 NG/ML
GLUCOSE QUALITATIVE U: ABNORMAL
GLUCOSE SERPL-MCNC: 96 MG/DL
HBA1C MFR BLD HPLC: 11.3 %
HCT VFR BLD CALC: 43.8 %
HDLC SERPL-MCNC: 31 MG/DL
HGB BLD-MCNC: 14.4 G/DL
HYALINE CASTS: 0 /LPF
IMM GRANULOCYTES NFR BLD AUTO: 0.4 %
IRON SATN MFR SERPL: 32 %
IRON SERPL-MCNC: 114 UG/DL
KETONES URINE: NEGATIVE
LDLC SERPL CALC-MCNC: 97 MG/DL
LEUKOCYTE ESTERASE URINE: NEGATIVE
LYMPHOCYTES # BLD AUTO: 3.53 K/UL
LYMPHOCYTES NFR BLD AUTO: 43.3 %
MAN DIFF?: NORMAL
MCHC RBC-ENTMCNC: 28.6 PG
MCHC RBC-ENTMCNC: 32.9 GM/DL
MCV RBC AUTO: 87.1 FL
MICROSCOPIC-UA: NORMAL
MONOCYTES # BLD AUTO: 0.48 K/UL
MONOCYTES NFR BLD AUTO: 5.9 %
NEUTROPHILS # BLD AUTO: 3.91 K/UL
NEUTROPHILS NFR BLD AUTO: 47.9 %
NITRITE URINE: NEGATIVE
NONHDLC SERPL-MCNC: 121 MG/DL
PH URINE: 5.5
PLATELET # BLD AUTO: 280 K/UL
POTASSIUM SERPL-SCNC: 4.2 MMOL/L
PROT SERPL-MCNC: 8.5 G/DL
PROT UR-MCNC: 11 MG/DL
PROTEIN URINE: NORMAL
PSA SERPL-MCNC: 0.96 NG/ML
RBC # BLD: 5.03 M/UL
RBC # FLD: 13 %
RED BLOOD CELLS URINE: 3 /HPF
SODIUM SERPL-SCNC: 140 MMOL/L
SPECIFIC GRAVITY URINE: 1.03
SQUAMOUS EPITHELIAL CELLS: 1 /HPF
T4 FREE SERPL-MCNC: 1.2 NG/DL
TIBC SERPL-MCNC: 356 UG/DL
TRIGL SERPL-MCNC: 119 MG/DL
TSH SERPL-ACNC: 1.17 UIU/ML
UIBC SERPL-MCNC: 242 UG/DL
URATE SERPL-MCNC: 4.4 MG/DL
UROBILINOGEN URINE: NORMAL
VIT B12 SERPL-MCNC: 663 PG/ML
WBC # FLD AUTO: 8.16 K/UL
WHITE BLOOD CELLS URINE: 2 /HPF

## 2022-11-08 PROCEDURE — 99204 OFFICE O/P NEW MOD 45 MIN: CPT | Mod: 25

## 2022-11-08 PROCEDURE — 93000 ELECTROCARDIOGRAM COMPLETE: CPT

## 2022-11-08 PROCEDURE — 99214 OFFICE O/P EST MOD 30 MIN: CPT | Mod: 25

## 2022-11-08 PROCEDURE — G0108 DIAB MANAGE TRN  PER INDIV: CPT

## 2022-11-08 PROCEDURE — G0008: CPT

## 2022-11-08 PROCEDURE — 90686 IIV4 VACC NO PRSV 0.5 ML IM: CPT

## 2022-11-08 RX ORDER — METFORMIN HYDROCHLORIDE 500 MG/1
500 TABLET, COATED ORAL TWICE DAILY
Qty: 180 | Refills: 1 | Status: COMPLETED | COMMUNITY
Start: 2022-11-02 | End: 2022-11-08

## 2022-11-08 RX ORDER — INSULIN LISPRO 100 [IU]/ML
100 INJECTION, SOLUTION INTRAVENOUS; SUBCUTANEOUS
Qty: 15 | Refills: 0 | Status: ACTIVE | COMMUNITY
Start: 2022-10-22

## 2022-11-08 RX ORDER — ATORVASTATIN CALCIUM 40 MG/1
40 TABLET, FILM COATED ORAL
Qty: 90 | Refills: 0 | Status: COMPLETED | COMMUNITY
Start: 2022-11-02 | End: 2022-11-08

## 2022-11-08 NOTE — HISTORY OF PRESENT ILLNESS
[FreeTextEntry1] : new pcp [de-identified] : KARMEN CESPEDES is a 42 year old male , recently diagnosed  type 2 DM, HTN, HLD recently discharged from [facility] due to acute pancreatis due hypertriglyceridemia, presented to the office today for pcp care\par He was recently admitted to [facility] for newly diagnosed DM with DKA a1c 12s and pancreatitis due hyper triglycerides in 2000s. \par Since discharge, he saw endocrine Dr Servin and has been complaint with diet and meds.\par Saw optho as well, pt reports no diabetes in eyes.\par Patient feels well. No complaints. Denies chest pain, sob, santos, dizziness, diaphoresis, palpitations, LE swelling, orthopnea, syncope, n/v, headache.\par

## 2022-11-08 NOTE — PHYSICAL EXAM
[No Acute Distress] : no acute distress [Well Nourished] : well nourished [Well Developed] : well developed [Well-Appearing] : well-appearing [Normal Sclera/Conjunctiva] : normal sclera/conjunctiva [Normal Outer Ear/Nose] : the outer ears and nose were normal in appearance [No JVD] : no jugular venous distention [Supple] : supple [No Respiratory Distress] : no respiratory distress  [Clear to Auscultation] : lungs were clear to auscultation bilaterally [Normal Rate] : normal rate  [Regular Rhythm] : with a regular rhythm [Normal S1, S2] : normal S1 and S2 [No Murmur] : no murmur heard [No Carotid Bruits] : no carotid bruits [No Varicosities] : no varicosities [Pedal Pulses Present] : the pedal pulses are present [No Edema] : there was no peripheral edema [No Extremity Clubbing/Cyanosis] : no extremity clubbing/cyanosis [Soft] : abdomen soft [Non Tender] : non-tender [Non-distended] : non-distended [Normal Bowel Sounds] : normal bowel sounds [No CVA Tenderness] : no CVA  tenderness [No Spinal Tenderness] : no spinal tenderness [No Joint Swelling] : no joint swelling [Grossly Normal Strength/Tone] : grossly normal strength/tone [No Rash] : no rash [Coordination Grossly Intact] : coordination grossly intact [No Focal Deficits] : no focal deficits [Normal Gait] : normal gait [Normal Affect] : the affect was normal [Alert and Oriented x3] : oriented to person, place, and time

## 2022-11-11 ENCOUNTER — TRANSCRIPTION ENCOUNTER (OUTPATIENT)
Age: 42
End: 2022-11-11

## 2022-11-28 ENCOUNTER — APPOINTMENT (OUTPATIENT)
Dept: CARDIOLOGY | Facility: CLINIC | Age: 42
End: 2022-11-28
Payer: COMMERCIAL

## 2022-11-28 ENCOUNTER — TRANSCRIPTION ENCOUNTER (OUTPATIENT)
Age: 42
End: 2022-11-28

## 2022-11-28 VITALS
DIASTOLIC BLOOD PRESSURE: 88 MMHG | OXYGEN SATURATION: 99 % | HEART RATE: 77 BPM | WEIGHT: 180 LBS | HEIGHT: 69 IN | BODY MASS INDEX: 26.66 KG/M2 | SYSTOLIC BLOOD PRESSURE: 138 MMHG | RESPIRATION RATE: 16 BRPM

## 2022-11-28 PROCEDURE — 99205 OFFICE O/P NEW HI 60 MIN: CPT

## 2022-11-28 PROCEDURE — 99402 PREV MED CNSL INDIV APPRX 30: CPT

## 2022-12-12 ENCOUNTER — APPOINTMENT (OUTPATIENT)
Dept: ENDOCRINOLOGY | Facility: CLINIC | Age: 42
End: 2022-12-12
Payer: COMMERCIAL

## 2022-12-12 VITALS
BODY MASS INDEX: 27.7 KG/M2 | TEMPERATURE: 98.9 F | OXYGEN SATURATION: 99 % | HEIGHT: 69 IN | HEART RATE: 78 BPM | WEIGHT: 187 LBS | DIASTOLIC BLOOD PRESSURE: 90 MMHG | SYSTOLIC BLOOD PRESSURE: 130 MMHG

## 2022-12-12 LAB
GLUCOSE BLDC GLUCOMTR-MCNC: 98
HBA1C MFR BLD HPLC: 6
HBA1C MFR BLD HPLC: 7.9

## 2022-12-12 PROCEDURE — 82962 GLUCOSE BLOOD TEST: CPT

## 2022-12-12 PROCEDURE — 83036 HEMOGLOBIN GLYCOSYLATED A1C: CPT | Mod: QW

## 2022-12-12 PROCEDURE — 95251 CONT GLUC MNTR ANALYSIS I&R: CPT

## 2022-12-12 PROCEDURE — 99214 OFFICE O/P EST MOD 30 MIN: CPT | Mod: 25

## 2022-12-12 NOTE — HISTORY OF PRESENT ILLNESS
[FreeTextEntry1] : 42 yea rM here for assessment for f/up  diagnosed Type 2 diabetes mellitus.\par \par Summary:\par Patient was admitted to [facility] with pancreatitis, TGs >2000, was treated with IV insulin, A1c 12%, was discharged on semglee and humalog, as well as lipitor 40mg daily and fenofibrate 145mg daily, scheduled to see Dr Calderon for lipid management. \par \par Self-reported weight loss. Reports feels better, vision improved, modified diet. \par  \par Screening \par Ophthalmology: follows\par \par on ACE-I\par on fibrate (saw Dr Calderon: lipid specialist) \par \par \par Current diabetic medication regimen (verified with patient): \par \par Semglee 18 units Q pm\par Humalog 5-5-5-0 units \par  \par \par Hypoglycemia: none reported\par \par \par Ambulatory glucose profile (AGP):  \par \par Device: Abbott Freestyle Tra 3\par \par Glucose Management Indicator (GMI): 6.8% \par Average Bmg/dl \par \par TIR:  \par TIR >250 mg/dl: 0% \par TIR >180mg/dl: 0% \par TIR 70 to 180mg/dl: 88% \par TIR <70mg/dl: 0% \par TIR <54mg/dl: 0% \par \par Coefficient of variation: 21.1% \par \par Time (%) CGM active: 85\par \par \par

## 2022-12-20 ENCOUNTER — APPOINTMENT (OUTPATIENT)
Dept: ENDOCRINOLOGY | Facility: CLINIC | Age: 42
End: 2022-12-20

## 2022-12-20 VITALS — BODY MASS INDEX: 27.25 KG/M2 | HEIGHT: 69 IN | WEIGHT: 184 LBS

## 2022-12-20 PROCEDURE — G0108 DIAB MANAGE TRN  PER INDIV: CPT

## 2023-03-13 ENCOUNTER — APPOINTMENT (OUTPATIENT)
Dept: INTERNAL MEDICINE | Facility: CLINIC | Age: 43
End: 2023-03-13
Payer: COMMERCIAL

## 2023-03-13 ENCOUNTER — APPOINTMENT (OUTPATIENT)
Dept: ENDOCRINOLOGY | Facility: CLINIC | Age: 43
End: 2023-03-13
Payer: COMMERCIAL

## 2023-03-13 VITALS
DIASTOLIC BLOOD PRESSURE: 98 MMHG | HEART RATE: 74 BPM | OXYGEN SATURATION: 99 % | HEIGHT: 69 IN | WEIGHT: 193 LBS | SYSTOLIC BLOOD PRESSURE: 136 MMHG | BODY MASS INDEX: 28.58 KG/M2

## 2023-03-13 VITALS
BODY MASS INDEX: 28.58 KG/M2 | HEIGHT: 69 IN | WEIGHT: 193 LBS | OXYGEN SATURATION: 99 % | DIASTOLIC BLOOD PRESSURE: 90 MMHG | SYSTOLIC BLOOD PRESSURE: 138 MMHG | HEART RATE: 66 BPM

## 2023-03-13 VITALS — SYSTOLIC BLOOD PRESSURE: 138 MMHG | DIASTOLIC BLOOD PRESSURE: 88 MMHG

## 2023-03-13 LAB
GLUCOSE BLDC GLUCOMTR-MCNC: 120
HBA1C MFR BLD HPLC: 6.2

## 2023-03-13 PROCEDURE — 99214 OFFICE O/P EST MOD 30 MIN: CPT

## 2023-03-13 PROCEDURE — 82962 GLUCOSE BLOOD TEST: CPT

## 2023-03-13 PROCEDURE — 83036 HEMOGLOBIN GLYCOSYLATED A1C: CPT | Mod: QW

## 2023-03-13 PROCEDURE — G0444 DEPRESSION SCREEN ANNUAL: CPT | Mod: 59

## 2023-03-13 PROCEDURE — 99214 OFFICE O/P EST MOD 30 MIN: CPT | Mod: 25

## 2023-03-13 RX ORDER — LANCETS 33 GAUGE
EACH MISCELLANEOUS
Qty: 360 | Refills: 2 | Status: ACTIVE | OUTPATIENT
Start: 2022-11-02

## 2023-03-13 RX ORDER — ATORVASTATIN CALCIUM 40 MG/1
40 TABLET, FILM COATED ORAL
Qty: 90 | Refills: 0 | Status: DISCONTINUED | COMMUNITY
Start: 2022-11-08 | End: 2023-03-13

## 2023-03-13 RX ORDER — BLOOD SUGAR DIAGNOSTIC
STRIP MISCELLANEOUS TWICE DAILY
Qty: 180 | Refills: 2 | Status: ACTIVE | OUTPATIENT
Start: 2022-11-02

## 2023-03-13 NOTE — HEALTH RISK ASSESSMENT
[0] : 2) Feeling down, depressed, or hopeless: Not at all (0) [PHQ-2 Negative - No further assessment needed] : PHQ-2 Negative - No further assessment needed [[id number]] : 0

## 2023-03-13 NOTE — HISTORY OF PRESENT ILLNESS
[FreeTextEntry1] : 42 yea rM here for assessment for f/up  diagnosed Type 2 diabetes mellitus.\par \par Summary:\par Patient was admitted to [facility] with pancreatitis, TGs >2000, was treated with IV insulin, A1c 12%, was discharged on semglee and humalog, as well as lipitor 40mg daily and fenofibrate 145mg daily, scheduled to see Dr Calderon for lipid management. \par \par  \par Screening \par Ophthalmology: follows\par \par on ACE-I\par on fibrate (saw Dr Calderon: lipid specialist) \par \par \par Current diabetic medication regimen (verified with patient): \par \par Semglee 16 units Q pm\par Humalog 5-5-5-0 units (often omits at dinner due to low normal pre-meal sugars)\par  \par \par Hypoglycemia: none reported\par \par SMBG reported\par am: 110-120 avg\par pre-meal: <150mg/dl\par \par no recent reported hypoglycemia \par \par \par

## 2023-03-13 NOTE — HISTORY OF PRESENT ILLNESS
[FreeTextEntry1] : F/u Diabetes Management [de-identified] : KARMEN CESPEDES is a 42 year old male , recently diagnosed type 2 DM, HTN, HLD presents to the office today for routine follow up. Pt. saw endo today, Dr. Servin started on metformin, A1C improved to 6.1 from 11.6. Pt states discontinuing statin 2 months ago, not currently taking it. Also says hes not taking fenofibrate given by Dr Tillman\par Patient feels well. No complaints. Denies chest pain, sob, santos, dizziness, diaphoresis, palpitations, LE swelling, orthopnea, syncope, n/v, headache.\par

## 2023-03-14 LAB
25(OH)D3 SERPL-MCNC: 20.2 NG/ML
ALBUMIN SERPL ELPH-MCNC: 4.5 G/DL
ALP BLD-CCNC: 57 U/L
ALT SERPL-CCNC: 26 U/L
ANION GAP SERPL CALC-SCNC: 12 MMOL/L
AST SERPL-CCNC: 21 U/L
BASOPHILS # BLD AUTO: 0.06 K/UL
BASOPHILS NFR BLD AUTO: 0.7 %
BILIRUB SERPL-MCNC: 0.4 MG/DL
BUN SERPL-MCNC: 13 MG/DL
CALCIUM SERPL-MCNC: 10.2 MG/DL
CHLORIDE SERPL-SCNC: 101 MMOL/L
CHOLEST SERPL-MCNC: 236 MG/DL
CK SERPL-CCNC: 160 U/L
CO2 SERPL-SCNC: 26 MMOL/L
CREAT SERPL-MCNC: 0.78 MG/DL
EGFR: 116 ML/MIN/1.73M2
EOSINOPHIL # BLD AUTO: 0.09 K/UL
EOSINOPHIL NFR BLD AUTO: 1.1 %
ESTIMATED AVERAGE GLUCOSE: 131 MG/DL
FERRITIN SERPL-MCNC: 250 NG/ML
GLUCOSE SERPL-MCNC: 105 MG/DL
HBA1C MFR BLD HPLC: 6.2 %
HCT VFR BLD CALC: 45.3 %
HDLC SERPL-MCNC: 30 MG/DL
HGB BLD-MCNC: 15.3 G/DL
IMM GRANULOCYTES NFR BLD AUTO: 0.2 %
LDLC SERPL CALC-MCNC: 150 MG/DL
LYMPHOCYTES # BLD AUTO: 2.9 K/UL
LYMPHOCYTES NFR BLD AUTO: 34.4 %
MAN DIFF?: NORMAL
MCHC RBC-ENTMCNC: 29.7 PG
MCHC RBC-ENTMCNC: 33.8 GM/DL
MCV RBC AUTO: 87.8 FL
MONOCYTES # BLD AUTO: 0.56 K/UL
MONOCYTES NFR BLD AUTO: 6.6 %
NEUTROPHILS # BLD AUTO: 4.81 K/UL
NEUTROPHILS NFR BLD AUTO: 57 %
NONHDLC SERPL-MCNC: 206 MG/DL
PLATELET # BLD AUTO: 270 K/UL
POTASSIUM SERPL-SCNC: 4.2 MMOL/L
PROT SERPL-MCNC: 7.9 G/DL
RBC # BLD: 5.16 M/UL
RBC # FLD: 12.5 %
SODIUM SERPL-SCNC: 140 MMOL/L
T4 FREE SERPL-MCNC: 1.1 NG/DL
TRIGL SERPL-MCNC: 280 MG/DL
TSH SERPL-ACNC: 1.63 UIU/ML
WBC # FLD AUTO: 8.44 K/UL

## 2023-03-15 RX ORDER — ATORVASTATIN CALCIUM 20 MG/1
20 TABLET, FILM COATED ORAL
Qty: 90 | Refills: 1 | Status: ACTIVE | COMMUNITY
Start: 2023-03-15 | End: 1900-01-01

## 2023-04-18 ENCOUNTER — APPOINTMENT (OUTPATIENT)
Dept: ENDOCRINOLOGY | Facility: CLINIC | Age: 43
End: 2023-04-18
Payer: COMMERCIAL

## 2023-04-18 VITALS — BODY MASS INDEX: 29.03 KG/M2 | WEIGHT: 196 LBS | HEIGHT: 69 IN

## 2023-04-18 PROCEDURE — G0108 DIAB MANAGE TRN  PER INDIV: CPT

## 2023-04-21 ENCOUNTER — APPOINTMENT (OUTPATIENT)
Dept: INTERNAL MEDICINE | Facility: CLINIC | Age: 43
End: 2023-04-21
Payer: COMMERCIAL

## 2023-04-21 ENCOUNTER — APPOINTMENT (OUTPATIENT)
Dept: ENDOCRINOLOGY | Facility: CLINIC | Age: 43
End: 2023-04-21
Payer: COMMERCIAL

## 2023-04-21 VITALS
BODY MASS INDEX: 29.07 KG/M2 | WEIGHT: 196.25 LBS | OXYGEN SATURATION: 98 % | DIASTOLIC BLOOD PRESSURE: 80 MMHG | TEMPERATURE: 98.4 F | HEART RATE: 81 BPM | HEIGHT: 69 IN | SYSTOLIC BLOOD PRESSURE: 124 MMHG

## 2023-04-21 VITALS — DIASTOLIC BLOOD PRESSURE: 80 MMHG | SYSTOLIC BLOOD PRESSURE: 118 MMHG

## 2023-04-21 VITALS
HEIGHT: 69 IN | WEIGHT: 196 LBS | BODY MASS INDEX: 29.03 KG/M2 | SYSTOLIC BLOOD PRESSURE: 132 MMHG | DIASTOLIC BLOOD PRESSURE: 90 MMHG | HEART RATE: 76 BPM | OXYGEN SATURATION: 97 %

## 2023-04-21 DIAGNOSIS — Z11.3 ENCOUNTER FOR SCREENING FOR INFECTIONS WITH A PREDOMINANTLY SEXUAL MODE OF TRANSMISSION: ICD-10-CM

## 2023-04-21 LAB — GLUCOSE BLDC GLUCOMTR-MCNC: 135

## 2023-04-21 PROCEDURE — 82962 GLUCOSE BLOOD TEST: CPT

## 2023-04-21 PROCEDURE — 99214 OFFICE O/P EST MOD 30 MIN: CPT

## 2023-04-21 NOTE — HISTORY OF PRESENT ILLNESS
[FreeTextEntry1] : 42 yea rM here for assessment for f/up  diagnosed Type 2 diabetes mellitus.\par \par Summary:\par Patient was admitted to [facility] with pancreatitis, TGs >2000, was treated with IV insulin, A1c 12%, was discharged on semglee and humalog, as well as lipitor 40mg daily and fenofibrate 145mg daily, scheduled to see Dr Calderon for lipid management. \par \par He has required less insulin given dietary change, lifestyle intervention \par \par \par  \par Screening \par Ophthalmology: follows\par \par on ACE-I\par on fibrate (saw Dr Calderon: lipid specialist) \par \par \par Current diabetic medication regimen (verified with patient): \par \par Semglee 16 units Q pm\par metformin 500mg po bid \par Humalog (has not required for over 6 weeks) \par  \par \par Hypoglycemia: none reported\par \par SMBG reported\par am: 110-120 avg\par pre-meal: <150mg/dl, one value >200mg/dl , was on vacation and did not adhere to diet that day \par \par no recent reported hypoglycemia \par \par \par

## 2023-04-21 NOTE — PHYSICAL EXAM
[No Acute Distress] : no acute distress [Well Nourished] : well nourished [Well Developed] : well developed [Well-Appearing] : well-appearing [Normal Sclera/Conjunctiva] : normal sclera/conjunctiva [Normal Outer Ear/Nose] : the outer ears and nose were normal in appearance [Normal Oropharynx] : the oropharynx was normal [No JVD] : no jugular venous distention [No Lymphadenopathy] : no lymphadenopathy [Supple] : supple [No Respiratory Distress] : no respiratory distress  [No Accessory Muscle Use] : no accessory muscle use [Clear to Auscultation] : lungs were clear to auscultation bilaterally [Normal Rate] : normal rate  [Regular Rhythm] : with a regular rhythm [Normal S1, S2] : normal S1 and S2 [No Murmur] : no murmur heard [No Varicosities] : no varicosities [No Carotid Bruits] : no carotid bruits [Pedal Pulses Present] : the pedal pulses are present [No Edema] : there was no peripheral edema [No Extremity Clubbing/Cyanosis] : no extremity clubbing/cyanosis [Soft] : abdomen soft [Non Tender] : non-tender [Non-distended] : non-distended [Normal Bowel Sounds] : normal bowel sounds [Normal Anterior Cervical Nodes] : no anterior cervical lymphadenopathy [No CVA Tenderness] : no CVA  tenderness [No Spinal Tenderness] : no spinal tenderness [No Joint Swelling] : no joint swelling [Grossly Normal Strength/Tone] : grossly normal strength/tone [No Rash] : no rash [Coordination Grossly Intact] : coordination grossly intact [No Focal Deficits] : no focal deficits [Normal Gait] : normal gait [Normal Affect] : the affect was normal [Alert and Oriented x3] : oriented to person, place, and time

## 2023-04-21 NOTE — DATA REVIEWED
[FreeTextEntry1] : Labs from [facility] reviewed on HIE from recent admission 10/19-10/22 2022

## 2023-04-22 PROBLEM — Z11.3 SCREENING FOR STD (SEXUALLY TRANSMITTED DISEASE): Status: ACTIVE | Noted: 2022-11-08

## 2023-04-22 LAB
ALBUMIN SERPL ELPH-MCNC: 4.8 G/DL
ALP BLD-CCNC: 65 U/L
ALT SERPL-CCNC: 18 U/L
ANION GAP SERPL CALC-SCNC: 14 MMOL/L
AST SERPL-CCNC: 19 U/L
BILIRUB SERPL-MCNC: 0.4 MG/DL
BUN SERPL-MCNC: 15 MG/DL
CALCIUM SERPL-MCNC: 8.7 MG/DL
CHLORIDE SERPL-SCNC: 104 MMOL/L
CHOLEST SERPL-MCNC: 133 MG/DL
CK SERPL-CCNC: 162 U/L
CO2 SERPL-SCNC: 22 MMOL/L
CREAT SERPL-MCNC: 0.7 MG/DL
EGFR: 119 ML/MIN/1.73M2
ESTIMATED AVERAGE GLUCOSE: 143 MG/DL
GLUCOSE SERPL-MCNC: 66 MG/DL
HAV IGM SER QL: NONREACTIVE
HBA1C MFR BLD HPLC: 6.6 %
HBV CORE IGM SER QL: NONREACTIVE
HBV SURFACE AG SER QL: NONREACTIVE
HCV AB SER QL: NONREACTIVE
HCV S/CO RATIO: 0.14 S/CO
HDLC SERPL-MCNC: 36 MG/DL
HIV1+2 AB SPEC QL IA.RAPID: NONREACTIVE
HSV 1+2 IGG SER IA-IMP: POSITIVE
HSV 1+2 IGG SER IA-IMP: POSITIVE
HSV1 IGG SER QL: 1.41 INDEX
HSV2 IGG SER QL: >23.6 INDEX
LDLC SERPL CALC-MCNC: 73 MG/DL
NONHDLC SERPL-MCNC: 97 MG/DL
POTASSIUM SERPL-SCNC: 4.2 MMOL/L
PROT SERPL-MCNC: 8 G/DL
SODIUM SERPL-SCNC: 139 MMOL/L
T PALLIDUM AB SER QL IA: NEGATIVE
TRIGL SERPL-MCNC: 117 MG/DL

## 2023-04-22 RX ORDER — FENOFIBRATE 145 MG/1
145 TABLET, COATED ORAL DAILY
Qty: 90 | Refills: 1 | Status: DISCONTINUED | COMMUNITY
Start: 2022-11-02 | End: 2023-04-22

## 2023-04-22 NOTE — HISTORY OF PRESENT ILLNESS
[FreeTextEntry1] : Follow Up Multiple Issues [de-identified] : KARMEN CESPEDES is a 42 year old male , recently diagnosed type 2 DM, HTN, HLD presents to the office today for follow up from the previous visit for multiple issues\par Says hes taking statin but self stopped fenofibrate\par Asking for HIV test and std testing, no symptoms\par \par Patient feels well. No complaints. Denies chest pain, sob, santos, dizziness, diaphoresis, palpitations, LE swelling, orthopnea, syncope, n/v, headache.\par

## 2023-04-28 LAB
HSV1 IGM SER QL: NEGATIVE
HSV2 AB FLD-ACNC: NEGATIVE

## 2023-05-26 ENCOUNTER — APPOINTMENT (OUTPATIENT)
Dept: CARDIOLOGY | Facility: CLINIC | Age: 43
End: 2023-05-26

## 2023-07-10 ENCOUNTER — APPOINTMENT (OUTPATIENT)
Dept: INTERNAL MEDICINE | Facility: CLINIC | Age: 43
End: 2023-07-10
Payer: COMMERCIAL

## 2023-07-10 VITALS
BODY MASS INDEX: 28.44 KG/M2 | OXYGEN SATURATION: 97 % | DIASTOLIC BLOOD PRESSURE: 100 MMHG | RESPIRATION RATE: 18 BRPM | WEIGHT: 192 LBS | SYSTOLIC BLOOD PRESSURE: 130 MMHG | HEIGHT: 69 IN | TEMPERATURE: 98.1 F | HEART RATE: 73 BPM

## 2023-07-10 VITALS — SYSTOLIC BLOOD PRESSURE: 140 MMHG | DIASTOLIC BLOOD PRESSURE: 100 MMHG

## 2023-07-10 PROCEDURE — 99214 OFFICE O/P EST MOD 30 MIN: CPT

## 2023-07-10 RX ORDER — ERGOCALCIFEROL 1.25 MG/1
1.25 MG CAPSULE, LIQUID FILLED ORAL
Qty: 8 | Refills: 0 | Status: DISCONTINUED | COMMUNITY
Start: 2022-11-09 | End: 2023-07-10

## 2023-07-10 NOTE — HISTORY OF PRESENT ILLNESS
[FreeTextEntry1] : 3 month f/u [de-identified] : KARMEN CESPEDES is a 42 year old male , recently diagnosed type 2 DM, HTN, HLD presents to the office today for 3 month follow up. He has been taking his Bp readings at home and has been getting a range of 128-130/78-90. \par Says he had dental work today and got some injection and think his bp is high due to that\par He missed his appt with Dr Tillman in May \par Denies chest pain, SOB, PATINO, dizziness, diaphoresis, palpitations, LE swelling, orthopnea, syncope, n/v, headache.\par Pt denies focal weakness, vision changes, numbness/tingling, dysphagia, dysarthria.\par

## 2023-07-10 NOTE — HEALTH RISK ASSESSMENT
[0] : 2) Feeling down, depressed, or hopeless: Not at all (0) [PHQ-2 Negative - No further assessment needed] : PHQ-2 Negative - No further assessment needed [Never] : Never [[id number]] : 0

## 2023-07-18 ENCOUNTER — APPOINTMENT (OUTPATIENT)
Dept: INTERNAL MEDICINE | Facility: CLINIC | Age: 43
End: 2023-07-18
Payer: COMMERCIAL

## 2023-07-18 VITALS
DIASTOLIC BLOOD PRESSURE: 96 MMHG | BODY MASS INDEX: 28.44 KG/M2 | WEIGHT: 192 LBS | SYSTOLIC BLOOD PRESSURE: 130 MMHG | OXYGEN SATURATION: 99 % | HEIGHT: 69 IN | HEART RATE: 58 BPM

## 2023-07-18 VITALS — DIASTOLIC BLOOD PRESSURE: 86 MMHG | SYSTOLIC BLOOD PRESSURE: 128 MMHG

## 2023-07-18 PROCEDURE — 99214 OFFICE O/P EST MOD 30 MIN: CPT

## 2023-07-19 LAB
ALBUMIN SERPL ELPH-MCNC: 4.6 G/DL
ALP BLD-CCNC: 65 U/L
ALT SERPL-CCNC: 40 U/L
ANION GAP SERPL CALC-SCNC: 12 MMOL/L
AST SERPL-CCNC: 30 U/L
BILIRUB SERPL-MCNC: 0.5 MG/DL
BUN SERPL-MCNC: 11 MG/DL
CALCIUM SERPL-MCNC: 9.9 MG/DL
CHLORIDE SERPL-SCNC: 104 MMOL/L
CHOLEST SERPL-MCNC: 153 MG/DL
CK SERPL-CCNC: 248 U/L
CO2 SERPL-SCNC: 25 MMOL/L
CREAT SERPL-MCNC: 0.8 MG/DL
EGFR: 114 ML/MIN/1.73M2
ESTIMATED AVERAGE GLUCOSE: 154 MG/DL
GLUCOSE SERPL-MCNC: 95 MG/DL
HBA1C MFR BLD HPLC: 7 %
HDLC SERPL-MCNC: 28 MG/DL
LDLC SERPL CALC-MCNC: 86 MG/DL
NONHDLC SERPL-MCNC: 125 MG/DL
POTASSIUM SERPL-SCNC: 4.1 MMOL/L
PROT SERPL-MCNC: 7.9 G/DL
SODIUM SERPL-SCNC: 141 MMOL/L
T4 FREE SERPL-MCNC: 1.1 NG/DL
TRIGL SERPL-MCNC: 229 MG/DL
TSH SERPL-ACNC: 1.1 UIU/ML

## 2023-07-19 RX ORDER — OMEGA-3/DHA/EPA/FISH OIL 300-1000MG
1000 CAPSULE ORAL
Qty: 90 | Refills: 1 | Status: ACTIVE | COMMUNITY
Start: 2023-07-19 | End: 1900-01-01

## 2023-07-24 ENCOUNTER — TRANSCRIPTION ENCOUNTER (OUTPATIENT)
Age: 43
End: 2023-07-24

## 2023-07-24 RX ORDER — INSULIN LISPRO 100 [IU]/ML
100 INJECTION, SOLUTION SUBCUTANEOUS 3 TIMES DAILY
Qty: 6 | Refills: 2 | Status: ACTIVE | COMMUNITY
Start: 2022-11-02 | End: 1900-01-01

## 2023-08-20 NOTE — HEALTH RISK ASSESSMENT
[0] : 2) Feeling down, depressed, or hopeless: Not at all (0) [PHQ-2 Negative - No further assessment needed] : PHQ-2 Negative - No further assessment needed [Never] : Never [[id number]] : 0

## 2023-08-20 NOTE — HISTORY OF PRESENT ILLNESS
[FreeTextEntry1] : bp f/u [de-identified] : KARMEN CESPEDES is a 43 year old male , recently diagnosed type 2 DM, HTN, HLD presents to the office today for bp follow up. Bp readings at home range 125-126/84-85\par Denies chest pain, SOB, PATINO, dizziness, diaphoresis, palpitations, LE swelling, orthopnea, syncope, n/v, headache.\par

## 2023-08-21 ENCOUNTER — APPOINTMENT (OUTPATIENT)
Dept: ENDOCRINOLOGY | Facility: CLINIC | Age: 43
End: 2023-08-21
Payer: COMMERCIAL

## 2023-08-21 VITALS
WEIGHT: 205.31 LBS | TEMPERATURE: 98.3 F | OXYGEN SATURATION: 98 % | SYSTOLIC BLOOD PRESSURE: 120 MMHG | HEART RATE: 77 BPM | HEIGHT: 69 IN | DIASTOLIC BLOOD PRESSURE: 84 MMHG | BODY MASS INDEX: 30.41 KG/M2

## 2023-08-21 PROCEDURE — 99214 OFFICE O/P EST MOD 30 MIN: CPT

## 2023-08-21 NOTE — HISTORY OF PRESENT ILLNESS
[FreeTextEntry1] : 43 year M here for f/up for type 2 diabetes mellitus.  Summary: Patient was initially admitted to [facility] with pancreatitis, TGs >2000, was treated with IV insulin, A1c 12%, was discharged on semglee and humalog, as well as lipitor 40mg daily and fenofibrate 145mg daily, saw Dr Calderon for lipid management.   He has required less insulin given dietary change, lifestyle intervention.   He has been controlled in recent months on basal-bolus regimen + metformin.    Screening  Ophthalmology: follows  on ACE-I on fibrate (saw Dr Calderon: lipid specialist)    Current diabetic medication regimen (verified with patient):   Semglee 16 units Q pm metformin 500mg po bid  Humalog 5 units tid-ac, typically uses with higher carb containing meals     Hypoglycemia: none reported  SMBG reported am: 110-130 avg pre-meal: <150mg/dl  no recent reported hypoglycemia

## 2023-10-03 ENCOUNTER — APPOINTMENT (OUTPATIENT)
Dept: ENDOCRINOLOGY | Facility: CLINIC | Age: 43
End: 2023-10-03

## 2023-12-01 ENCOUNTER — APPOINTMENT (OUTPATIENT)
Dept: INTERNAL MEDICINE | Facility: CLINIC | Age: 43
End: 2023-12-01
Payer: COMMERCIAL

## 2023-12-01 ENCOUNTER — APPOINTMENT (OUTPATIENT)
Dept: ENDOCRINOLOGY | Facility: CLINIC | Age: 43
End: 2023-12-01
Payer: COMMERCIAL

## 2023-12-01 ENCOUNTER — NON-APPOINTMENT (OUTPATIENT)
Age: 43
End: 2023-12-01

## 2023-12-01 VITALS
TEMPERATURE: 98 F | BODY MASS INDEX: 30.21 KG/M2 | DIASTOLIC BLOOD PRESSURE: 90 MMHG | SYSTOLIC BLOOD PRESSURE: 140 MMHG | OXYGEN SATURATION: 97 % | HEIGHT: 69 IN | WEIGHT: 204 LBS | HEART RATE: 70 BPM

## 2023-12-01 VITALS
DIASTOLIC BLOOD PRESSURE: 96 MMHG | OXYGEN SATURATION: 96 % | WEIGHT: 204 LBS | HEART RATE: 68 BPM | HEIGHT: 69 IN | SYSTOLIC BLOOD PRESSURE: 138 MMHG | BODY MASS INDEX: 30.21 KG/M2 | TEMPERATURE: 98 F

## 2023-12-01 VITALS — DIASTOLIC BLOOD PRESSURE: 100 MMHG | SYSTOLIC BLOOD PRESSURE: 148 MMHG

## 2023-12-01 DIAGNOSIS — R79.89 OTHER SPECIFIED ABNORMAL FINDINGS OF BLOOD CHEMISTRY: ICD-10-CM

## 2023-12-01 DIAGNOSIS — E78.1 PURE HYPERGLYCERIDEMIA: ICD-10-CM

## 2023-12-01 DIAGNOSIS — Z23 ENCOUNTER FOR IMMUNIZATION: ICD-10-CM

## 2023-12-01 DIAGNOSIS — E55.9 VITAMIN D DEFICIENCY, UNSPECIFIED: ICD-10-CM

## 2023-12-01 DIAGNOSIS — R94.31 ABNORMAL ELECTROCARDIOGRAM [ECG] [EKG]: ICD-10-CM

## 2023-12-01 DIAGNOSIS — Z12.9 ENCOUNTER FOR SCREENING FOR MALIGNANT NEOPLASM, SITE UNSPECIFIED: ICD-10-CM

## 2023-12-01 DIAGNOSIS — I10 ESSENTIAL (PRIMARY) HYPERTENSION: ICD-10-CM

## 2023-12-01 DIAGNOSIS — E78.5 HYPERLIPIDEMIA, UNSPECIFIED: ICD-10-CM

## 2023-12-01 DIAGNOSIS — Z79.4 LONG TERM (CURRENT) USE OF INSULIN: ICD-10-CM

## 2023-12-01 LAB
GLUCOSE BLDC GLUCOMTR-MCNC: 137
HBA1C MFR BLD HPLC: 8.5

## 2023-12-01 PROCEDURE — 83036 HEMOGLOBIN GLYCOSYLATED A1C: CPT | Mod: QW

## 2023-12-01 PROCEDURE — 90686 IIV4 VACC NO PRSV 0.5 ML IM: CPT

## 2023-12-01 PROCEDURE — G0008: CPT

## 2023-12-01 PROCEDURE — 99214 OFFICE O/P EST MOD 30 MIN: CPT | Mod: 25

## 2023-12-01 PROCEDURE — 82962 GLUCOSE BLOOD TEST: CPT

## 2023-12-01 PROCEDURE — 99214 OFFICE O/P EST MOD 30 MIN: CPT

## 2023-12-01 PROCEDURE — 93000 ELECTROCARDIOGRAM COMPLETE: CPT

## 2023-12-01 RX ORDER — PEN NEEDLE, DIABETIC 32 GX 1/4"
32G X 6 MM NEEDLE, DISPOSABLE MISCELLANEOUS
Qty: 3 | Refills: 1 | Status: ACTIVE | COMMUNITY
Start: 2022-11-28 | End: 1900-01-01

## 2023-12-01 RX ORDER — LISINOPRIL 40 MG/1
40 TABLET ORAL DAILY
Qty: 90 | Refills: 1 | Status: ACTIVE | COMMUNITY
Start: 2022-11-08 | End: 1900-01-01

## 2023-12-02 PROBLEM — Z12.9 CANCER SCREENING: Status: ACTIVE | Noted: 2022-11-08

## 2023-12-02 PROBLEM — Z23 FLU VACCINE NEED: Status: ACTIVE | Noted: 2022-11-08

## 2023-12-02 PROBLEM — R94.31 ABNORMAL EKG: Status: ACTIVE | Noted: 2023-12-01

## 2023-12-03 LAB
25(OH)D3 SERPL-MCNC: 16.4 NG/ML
ALBUMIN SERPL ELPH-MCNC: 4.7 G/DL
ALP BLD-CCNC: 83 U/L
ALT SERPL-CCNC: 68 U/L
ANION GAP SERPL CALC-SCNC: 13 MMOL/L
APPEARANCE: CLEAR
AST SERPL-CCNC: 39 U/L
BACTERIA: NEGATIVE /HPF
BASOPHILS # BLD AUTO: 0.07 K/UL
BASOPHILS NFR BLD AUTO: 0.8 %
BILIRUB SERPL-MCNC: 0.5 MG/DL
BILIRUBIN URINE: NEGATIVE
BLOOD URINE: NEGATIVE
BUN SERPL-MCNC: 16 MG/DL
CALCIUM SERPL-MCNC: 10.2 MG/DL
CAST: 0 /LPF
CHLORIDE SERPL-SCNC: 102 MMOL/L
CHOLEST SERPL-MCNC: 162 MG/DL
CK SERPL-CCNC: 257 U/L
CO2 SERPL-SCNC: 24 MMOL/L
COLOR: YELLOW
CREAT SERPL-MCNC: 0.75 MG/DL
CREAT SPEC-SCNC: 221 MG/DL
EGFR: 116 ML/MIN/1.73M2
EOSINOPHIL # BLD AUTO: 0.14 K/UL
EOSINOPHIL NFR BLD AUTO: 1.7 %
EPITHELIAL CELLS: 1 /HPF
ESTIMATED AVERAGE GLUCOSE: 189 MG/DL
FERRITIN SERPL-MCNC: 311 NG/ML
FOLATE SERPL-MCNC: 17.3 NG/ML
GLUCOSE QUALITATIVE U: 250 MG/DL
GLUCOSE SERPL-MCNC: 89 MG/DL
HBA1C MFR BLD HPLC: 8.2 %
HCT VFR BLD CALC: 47.7 %
HDLC SERPL-MCNC: 29 MG/DL
HGB BLD-MCNC: 15.7 G/DL
IMM GRANULOCYTES NFR BLD AUTO: 0.2 %
IRON SATN MFR SERPL: 27 %
IRON SERPL-MCNC: 79 UG/DL
KETONES URINE: NEGATIVE MG/DL
LDLC SERPL CALC-MCNC: 93 MG/DL
LEUKOCYTE ESTERASE URINE: NEGATIVE
LYMPHOCYTES # BLD AUTO: 3.41 K/UL
LYMPHOCYTES NFR BLD AUTO: 40.3 %
MAN DIFF?: NORMAL
MCHC RBC-ENTMCNC: 29.4 PG
MCHC RBC-ENTMCNC: 32.9 GM/DL
MCV RBC AUTO: 89.3 FL
MICROALBUMIN 24H UR DL<=1MG/L-MCNC: 4.4 MG/DL
MICROALBUMIN/CREAT 24H UR-RTO: 20 MG/G
MICROSCOPIC-UA: NORMAL
MONOCYTES # BLD AUTO: 0.66 K/UL
MONOCYTES NFR BLD AUTO: 7.8 %
NEUTROPHILS # BLD AUTO: 4.16 K/UL
NEUTROPHILS NFR BLD AUTO: 49.2 %
NITRITE URINE: NEGATIVE
NONHDLC SERPL-MCNC: 133 MG/DL
PH URINE: 6
PLATELET # BLD AUTO: 277 K/UL
POTASSIUM SERPL-SCNC: 3.6 MMOL/L
PROT SERPL-MCNC: 8.1 G/DL
PROTEIN URINE: NORMAL MG/DL
PSA SERPL-MCNC: 0.87 NG/ML
RBC # BLD: 5.34 M/UL
RBC # FLD: 13 %
RED BLOOD CELLS URINE: 1 /HPF
SODIUM SERPL-SCNC: 140 MMOL/L
SPECIFIC GRAVITY URINE: 1.03
T4 FREE SERPL-MCNC: 1.1 NG/DL
TIBC SERPL-MCNC: 290 UG/DL
TRIGL SERPL-MCNC: 237 MG/DL
TSH SERPL-ACNC: 1.73 UIU/ML
UIBC SERPL-MCNC: 212 UG/DL
URATE SERPL-MCNC: 5.6 MG/DL
UROBILINOGEN URINE: 1 MG/DL
VIT B12 SERPL-MCNC: 560 PG/ML
WBC # FLD AUTO: 8.46 K/UL
WHITE BLOOD CELLS URINE: 1 /HPF

## 2023-12-05 ENCOUNTER — NON-APPOINTMENT (OUTPATIENT)
Age: 43
End: 2023-12-05

## 2023-12-05 RX ORDER — ERGOCALCIFEROL 1.25 MG/1
1.25 MG CAPSULE, LIQUID FILLED ORAL
Qty: 8 | Refills: 0 | Status: ACTIVE | COMMUNITY
Start: 2023-12-05 | End: 1900-01-01

## 2023-12-13 ENCOUNTER — APPOINTMENT (OUTPATIENT)
Dept: CARDIOLOGY | Facility: CLINIC | Age: 43
End: 2023-12-13

## 2023-12-13 ENCOUNTER — APPOINTMENT (OUTPATIENT)
Dept: INTERNAL MEDICINE | Facility: CLINIC | Age: 43
End: 2023-12-13

## 2023-12-13 NOTE — HEALTH RISK ASSESSMENT
[0] : 2) Feeling down, depressed, or hopeless: Not at all (0) [PHQ-2 Negative - No further assessment needed] : PHQ-2 Negative - No further assessment needed [[id number]] : 0 [Never] : Never

## 2024-03-01 ENCOUNTER — APPOINTMENT (OUTPATIENT)
Dept: ENDOCRINOLOGY | Facility: CLINIC | Age: 44
End: 2024-03-01
Payer: COMMERCIAL

## 2024-03-01 VITALS
WEIGHT: 201 LBS | BODY MASS INDEX: 29.77 KG/M2 | OXYGEN SATURATION: 100 % | HEART RATE: 78 BPM | HEIGHT: 69 IN | SYSTOLIC BLOOD PRESSURE: 138 MMHG | DIASTOLIC BLOOD PRESSURE: 102 MMHG | TEMPERATURE: 97.5 F

## 2024-03-01 DIAGNOSIS — E11.9 TYPE 2 DIABETES MELLITUS W/OUT COMPLICATIONS: ICD-10-CM

## 2024-03-01 DIAGNOSIS — Z79.4 TYPE 2 DIABETES MELLITUS W/OUT COMPLICATIONS: ICD-10-CM

## 2024-03-01 DIAGNOSIS — E66.3 OVERWEIGHT: ICD-10-CM

## 2024-03-01 LAB
GLUCOSE BLDC GLUCOMTR-MCNC: 168
HBA1C MFR BLD HPLC: 7.9

## 2024-03-01 PROCEDURE — 82962 GLUCOSE BLOOD TEST: CPT

## 2024-03-01 PROCEDURE — 83036 HEMOGLOBIN GLYCOSYLATED A1C: CPT | Mod: QW

## 2024-03-01 PROCEDURE — 99214 OFFICE O/P EST MOD 30 MIN: CPT

## 2024-03-01 RX ORDER — BLOOD-GLUCOSE METER
70 EACH MISCELLANEOUS
Qty: 360 | Refills: 2 | Status: ACTIVE | COMMUNITY
Start: 2022-11-02 | End: 1900-01-01

## 2024-03-01 RX ORDER — BLOOD-GLUCOSE SENSOR
EACH MISCELLANEOUS
Qty: 2 | Refills: 5 | Status: ACTIVE | COMMUNITY
Start: 2022-11-02 | End: 1900-01-01

## 2024-03-01 RX ORDER — METFORMIN ER 500 MG 500 MG/1
500 TABLET ORAL
Qty: 180 | Refills: 2 | Status: ACTIVE | COMMUNITY
Start: 2024-03-01 | End: 1900-01-01

## 2024-03-01 RX ORDER — INSULIN GLARGINE-YFGN 100 [IU]/ML
100 INJECTION, SOLUTION SUBCUTANEOUS DAILY
Qty: 10 | Refills: 1 | Status: ACTIVE | COMMUNITY
Start: 2022-11-02 | End: 1900-01-01

## 2024-03-01 RX ORDER — METFORMIN HYDROCHLORIDE 1000 MG/1
1000 TABLET, COATED ORAL TWICE DAILY
Qty: 180 | Refills: 1 | Status: DISCONTINUED | COMMUNITY
Start: 2023-03-13 | End: 2024-03-01

## 2024-03-01 NOTE — PHYSICAL EXAM
[Alert] : alert [Well Nourished] : well nourished [No Acute Distress] : no acute distress [Well Developed] : well developed [Normal Sclera/Conjunctiva] : normal sclera/conjunctiva [EOMI] : extra ocular movement intact [No Proptosis] : no proptosis [Normal Oropharynx] : the oropharynx was normal [No Thyroid Nodules] : no palpable thyroid nodules [Thyroid Not Enlarged] : the thyroid was not enlarged [No Respiratory Distress] : no respiratory distress [No Accessory Muscle Use] : no accessory muscle use [Clear to Auscultation] : lungs were clear to auscultation bilaterally [Normal S1, S2] : normal S1 and S2 [Normal Rate] : heart rate was normal [Regular Rhythm] : with a regular rhythm [No Edema] : no peripheral edema [Pedal Pulses Normal] : the pedal pulses are present [Normal Bowel Sounds] : normal bowel sounds [Not Tender] : non-tender [Not Distended] : not distended [Soft] : abdomen soft [Normal Anterior Cervical Nodes] : no anterior cervical lymphadenopathy [No Spinal Tenderness] : no spinal tenderness [Spine Straight] : spine straight [No Stigmata of Cushings Syndrome] : no stigmata of Cushings Syndrome [Normal Gait] : normal gait [Normal Strength/Tone] : muscle strength and tone were normal [No Rash] : no rash [Normal Reflexes] : deep tendon reflexes were 2+ and symmetric [No Tremors] : no tremors [Oriented x3] : oriented to person, place, and time [Acanthosis Nigricans] : no acanthosis nigricans

## 2024-03-01 NOTE — HISTORY OF PRESENT ILLNESS
[FreeTextEntry1] : 43 year M here for f/up for type 2 diabetes mellitus.  Summary: Patient was initially admitted to [facility] with pancreatitis, TGs >2000, was treated with IV insulin, A1c 12%, was discharged on semglee and humalog, as well as lipitor 40mg daily and fenofibrate 145mg daily, saw Dr Calderon for lipid management.   He has required less insulin given dietary change, lifestyle intervention.   He has been controlled in recent months on basal-bolus regimen + metformin.    Screening  Ophthalmology: follows  on ACE-I on fibrate (saw Dr Calderon: lipid specialist)    Current diabetic medication regimen (verified with patient):   Semglee 12 units Q pm metformin 1000mg po bid (omitted recently due to diarrhea) Humalog 5 units tid-ac with pre-meal glucose >180mg/dl     Hypoglycemia: none reported  SMBG reported avg< 180mg/dl    no recent reported hypoglycemia     weight-bearing as tolerated

## 2024-07-22 ENCOUNTER — APPOINTMENT (OUTPATIENT)
Dept: ENDOCRINOLOGY | Facility: CLINIC | Age: 44
End: 2024-07-22
Payer: COMMERCIAL

## 2024-07-22 VITALS
BODY MASS INDEX: 29.92 KG/M2 | HEART RATE: 72 BPM | TEMPERATURE: 98.3 F | WEIGHT: 202 LBS | OXYGEN SATURATION: 98 % | SYSTOLIC BLOOD PRESSURE: 140 MMHG | RESPIRATION RATE: 18 BRPM | HEIGHT: 69 IN | DIASTOLIC BLOOD PRESSURE: 100 MMHG

## 2024-07-22 DIAGNOSIS — Z79.4 LONG TERM (CURRENT) USE OF INSULIN: ICD-10-CM

## 2024-07-22 DIAGNOSIS — E11.9 TYPE 2 DIABETES MELLITUS W/OUT COMPLICATIONS: ICD-10-CM

## 2024-07-22 DIAGNOSIS — Z79.4 TYPE 2 DIABETES MELLITUS W/OUT COMPLICATIONS: ICD-10-CM

## 2024-07-22 DIAGNOSIS — E66.3 OVERWEIGHT: ICD-10-CM

## 2024-07-22 LAB
GLUCOSE BLDC GLUCOMTR-MCNC: 178
HBA1C MFR BLD HPLC: 8.6

## 2024-07-22 PROCEDURE — 82962 GLUCOSE BLOOD TEST: CPT

## 2024-07-22 PROCEDURE — 99214 OFFICE O/P EST MOD 30 MIN: CPT

## 2024-07-22 PROCEDURE — 83036 HEMOGLOBIN GLYCOSYLATED A1C: CPT | Mod: QW

## 2024-07-22 RX ORDER — LANCETS 33 GAUGE
EACH MISCELLANEOUS
Qty: 180 | Refills: 2 | Status: ACTIVE | COMMUNITY
Start: 2024-07-22 | End: 1900-01-01

## 2024-07-22 RX ORDER — BLOOD-GLUCOSE METER
W/DEVICE KIT MISCELLANEOUS
Qty: 1 | Refills: 0 | Status: ACTIVE | COMMUNITY
Start: 2024-07-22 | End: 1900-01-01

## 2024-07-22 RX ORDER — BLOOD SUGAR DIAGNOSTIC
STRIP MISCELLANEOUS TWICE DAILY
Qty: 1 | Refills: 2 | Status: ACTIVE | COMMUNITY
Start: 2024-07-22 | End: 1900-01-01

## 2024-07-22 RX ORDER — PEN NEEDLE, DIABETIC 29 G X1/2"
32G X 4 MM NEEDLE, DISPOSABLE MISCELLANEOUS
Qty: 1 | Refills: 2 | Status: ACTIVE | COMMUNITY
Start: 2024-07-22 | End: 1900-01-01

## 2024-07-22 RX ORDER — GLUCOSAM/CHON-MSM1/C/MANG/BOSW 500-416.6
TABLET ORAL
Qty: 1 | Refills: 0 | Status: ACTIVE | COMMUNITY
Start: 2024-07-22 | End: 1900-01-01

## 2024-07-22 RX ORDER — EMPAGLIFLOZIN 10 MG/1
10 TABLET, FILM COATED ORAL DAILY
Qty: 90 | Refills: 1 | Status: ACTIVE | COMMUNITY
Start: 2024-07-22 | End: 1900-01-01

## 2024-07-22 NOTE — HISTORY OF PRESENT ILLNESS
[FreeTextEntry1] : 44 year M here for f/up for type 2 diabetes mellitus.  Summary: Patient was initially admitted to [facility] with pancreatitis, TGs >2000, was treated with IV insulin, A1c 12%, was discharged on semglee and humalog, as well as lipitor 40mg daily and fenofibrate 145mg daily, saw Dr Calderon for lipid management.   He had required less insulin given dietary change, lifestyle intervention.    Screening  Ophthalmology: follows  on ACE-I on fibrate (saw Dr Calderon: lipid specialist)    Current diabetic medication regimen (verified with patient):   Semglee 12 units Q pm metformin ER 500mg po bid (takes 3x/ week due to diarrhea)    Hx of pancreatitis: avoiding GLP1RA/ DPP4-i  Hypoglycemia: none reported  SMBG reported avg< 180mg/dl    no recent reported hypoglycemia

## 2024-11-08 ENCOUNTER — APPOINTMENT (OUTPATIENT)
Dept: ENDOCRINOLOGY | Facility: CLINIC | Age: 44
End: 2024-11-08
Payer: COMMERCIAL

## 2024-11-08 VITALS
DIASTOLIC BLOOD PRESSURE: 111 MMHG | WEIGHT: 199 LBS | OXYGEN SATURATION: 100 % | HEART RATE: 70 BPM | SYSTOLIC BLOOD PRESSURE: 172 MMHG | HEIGHT: 69 IN | BODY MASS INDEX: 29.47 KG/M2 | RESPIRATION RATE: 18 BRPM | TEMPERATURE: 97 F

## 2024-11-08 DIAGNOSIS — Z79.4 LONG TERM (CURRENT) USE OF INSULIN: ICD-10-CM

## 2024-11-08 DIAGNOSIS — E11.9 TYPE 2 DIABETES MELLITUS W/OUT COMPLICATIONS: ICD-10-CM

## 2024-11-08 DIAGNOSIS — Z79.4 TYPE 2 DIABETES MELLITUS W/OUT COMPLICATIONS: ICD-10-CM

## 2024-11-08 DIAGNOSIS — E66.3 OVERWEIGHT: ICD-10-CM

## 2024-11-08 LAB
GLUCOSE BLDC GLUCOMTR-MCNC: 131
HBA1C MFR BLD HPLC: 8.4

## 2024-11-08 PROCEDURE — 99214 OFFICE O/P EST MOD 30 MIN: CPT

## 2024-11-08 PROCEDURE — 83036 HEMOGLOBIN GLYCOSYLATED A1C: CPT | Mod: QW

## 2024-11-08 PROCEDURE — 82962 GLUCOSE BLOOD TEST: CPT

## 2025-03-19 ENCOUNTER — TRANSCRIPTION ENCOUNTER (OUTPATIENT)
Age: 45
End: 2025-03-19

## 2025-04-11 ENCOUNTER — APPOINTMENT (OUTPATIENT)
Dept: ENDOCRINOLOGY | Facility: CLINIC | Age: 45
End: 2025-04-11
Payer: COMMERCIAL

## 2025-04-11 VITALS
TEMPERATURE: 97.2 F | HEIGHT: 69 IN | DIASTOLIC BLOOD PRESSURE: 120 MMHG | HEART RATE: 79 BPM | SYSTOLIC BLOOD PRESSURE: 166 MMHG | RESPIRATION RATE: 18 BRPM | WEIGHT: 193 LBS | BODY MASS INDEX: 28.58 KG/M2 | OXYGEN SATURATION: 100 %

## 2025-04-11 DIAGNOSIS — E66.3 OVERWEIGHT: ICD-10-CM

## 2025-04-11 DIAGNOSIS — E11.9 TYPE 2 DIABETES MELLITUS W/OUT COMPLICATIONS: ICD-10-CM

## 2025-04-11 DIAGNOSIS — Z79.4 LONG TERM (CURRENT) USE OF INSULIN: ICD-10-CM

## 2025-04-11 DIAGNOSIS — Z79.4 TYPE 2 DIABETES MELLITUS W/OUT COMPLICATIONS: ICD-10-CM

## 2025-04-11 LAB
GLUCOSE BLDC GLUCOMTR-MCNC: 184
HBA1C MFR BLD HPLC: 11.1

## 2025-04-11 PROCEDURE — 99214 OFFICE O/P EST MOD 30 MIN: CPT

## 2025-04-11 PROCEDURE — 83036 HEMOGLOBIN GLYCOSYLATED A1C: CPT | Mod: QW

## 2025-04-11 PROCEDURE — 82962 GLUCOSE BLOOD TEST: CPT

## 2025-06-05 ENCOUNTER — TRANSCRIPTION ENCOUNTER (OUTPATIENT)
Age: 45
End: 2025-06-05

## 2025-07-15 ENCOUNTER — NON-APPOINTMENT (OUTPATIENT)
Age: 45
End: 2025-07-15

## 2025-07-17 ENCOUNTER — APPOINTMENT (OUTPATIENT)
Dept: ENDOCRINOLOGY | Facility: CLINIC | Age: 45
End: 2025-07-17

## 2025-08-12 ENCOUNTER — TRANSCRIPTION ENCOUNTER (OUTPATIENT)
Age: 45
End: 2025-08-12

## 2025-08-18 ENCOUNTER — APPOINTMENT (OUTPATIENT)
Dept: INTERNAL MEDICINE | Facility: CLINIC | Age: 45
End: 2025-08-18
Payer: COMMERCIAL

## 2025-08-18 ENCOUNTER — NON-APPOINTMENT (OUTPATIENT)
Age: 45
End: 2025-08-18

## 2025-08-18 VITALS
OXYGEN SATURATION: 97 % | HEIGHT: 69 IN | TEMPERATURE: 98.3 F | DIASTOLIC BLOOD PRESSURE: 80 MMHG | SYSTOLIC BLOOD PRESSURE: 110 MMHG | HEART RATE: 73 BPM | BODY MASS INDEX: 27.25 KG/M2 | WEIGHT: 184 LBS

## 2025-08-18 DIAGNOSIS — E66.3 OVERWEIGHT: ICD-10-CM

## 2025-08-18 DIAGNOSIS — R79.89 OTHER SPECIFIED ABNORMAL FINDINGS OF BLOOD CHEMISTRY: ICD-10-CM

## 2025-08-18 DIAGNOSIS — R94.31 ABNORMAL ELECTROCARDIOGRAM [ECG] [EKG]: ICD-10-CM

## 2025-08-18 DIAGNOSIS — I10 ESSENTIAL (PRIMARY) HYPERTENSION: ICD-10-CM

## 2025-08-18 DIAGNOSIS — Z71.85 ENCOUNTER FOR IMMUNIZATION SAFETY COUNSELING: ICD-10-CM

## 2025-08-18 DIAGNOSIS — E11.9 TYPE 2 DIABETES MELLITUS W/OUT COMPLICATIONS: ICD-10-CM

## 2025-08-18 DIAGNOSIS — E78.5 HYPERLIPIDEMIA, UNSPECIFIED: ICD-10-CM

## 2025-08-18 DIAGNOSIS — Z13.228 ENCOUNTER FOR SCREENING FOR OTHER METABOLIC DISORDERS: ICD-10-CM

## 2025-08-18 DIAGNOSIS — E78.1 PURE HYPERGLYCERIDEMIA: ICD-10-CM

## 2025-08-18 DIAGNOSIS — Z01.818 ENCOUNTER FOR OTHER PREPROCEDURAL EXAMINATION: ICD-10-CM

## 2025-08-18 DIAGNOSIS — Z12.9 ENCOUNTER FOR SCREENING FOR MALIGNANT NEOPLASM, SITE UNSPECIFIED: ICD-10-CM

## 2025-08-18 DIAGNOSIS — Z00.00 ENCOUNTER FOR GENERAL ADULT MEDICAL EXAMINATION W/OUT ABNORMAL FINDINGS: ICD-10-CM

## 2025-08-18 DIAGNOSIS — Z79.4 TYPE 2 DIABETES MELLITUS W/OUT COMPLICATIONS: ICD-10-CM

## 2025-08-18 PROCEDURE — 99386 PREV VISIT NEW AGE 40-64: CPT

## 2025-08-18 PROCEDURE — 93000 ELECTROCARDIOGRAM COMPLETE: CPT

## 2025-08-18 PROCEDURE — 99396 PREV VISIT EST AGE 40-64: CPT

## 2025-08-18 PROCEDURE — 99212 OFFICE O/P EST SF 10 MIN: CPT | Mod: 25

## 2025-08-19 ENCOUNTER — TRANSCRIPTION ENCOUNTER (OUTPATIENT)
Age: 45
End: 2025-08-19

## 2025-08-19 LAB
25(OH)D3 SERPL-MCNC: 15 NG/ML
ALBUMIN SERPL ELPH-MCNC: 4.7 G/DL
ALBUMIN, RANDOM URINE: 5.3 MG/DL
ALP BLD-CCNC: 96 U/L
ALT SERPL-CCNC: 41 U/L
ANION GAP SERPL CALC-SCNC: 16 MMOL/L
APPEARANCE: CLEAR
AST SERPL-CCNC: 25 U/L
BACTERIA: NEGATIVE /HPF
BASOPHILS # BLD AUTO: 0.06 K/UL
BASOPHILS NFR BLD AUTO: 0.8 %
BILIRUB SERPL-MCNC: 0.8 MG/DL
BILIRUBIN URINE: NEGATIVE
BLOOD URINE: NEGATIVE
BUN SERPL-MCNC: 14 MG/DL
CALCIUM SERPL-MCNC: 9.9 MG/DL
CAST: 0 /LPF
CHLORIDE SERPL-SCNC: 99 MMOL/L
CHOLEST SERPL-MCNC: 212 MG/DL
CK SERPL-CCNC: 119 U/L
CO2 SERPL-SCNC: 22 MMOL/L
COLOR: YELLOW
CREAT SERPL-MCNC: 0.72 MG/DL
CREAT SPEC-SCNC: 230 MG/DL
EGFRCR SERPLBLD CKD-EPI 2021: 116 ML/MIN/1.73M2
EOSINOPHIL # BLD AUTO: 0.1 K/UL
EOSINOPHIL NFR BLD AUTO: 1.4 %
EPITHELIAL CELLS: 0 /HPF
ESTIMATED AVERAGE GLUCOSE: 312 MG/DL
FERRITIN SERPL-MCNC: 465 NG/ML
FOLATE SERPL-MCNC: 13.5 NG/ML
GLUCOSE QUALITATIVE U: >=1000 MG/DL
GLUCOSE SERPL-MCNC: 206 MG/DL
HBA1C MFR BLD HPLC: 12.5 %
HCT VFR BLD CALC: 46 %
HDLC SERPL-MCNC: 27 MG/DL
HGB BLD-MCNC: 15.3 G/DL
IMM GRANULOCYTES NFR BLD AUTO: 0.3 %
IRON SATN MFR SERPL: 54 %
IRON SERPL-MCNC: 161 UG/DL
KETONES URINE: 15 MG/DL
LDLC SERPL-MCNC: 103 MG/DL
LEUKOCYTE ESTERASE URINE: NEGATIVE
LYMPHOCYTES # BLD AUTO: 2.85 K/UL
LYMPHOCYTES NFR BLD AUTO: 39.3 %
MAN DIFF?: NORMAL
MCHC RBC-ENTMCNC: 28.5 PG
MCHC RBC-ENTMCNC: 33.3 G/DL
MCV RBC AUTO: 85.7 FL
MICROALBUMIN/CREAT 24H UR-RTO: 23 MG/G
MICROSCOPIC-UA: NORMAL
MONOCYTES # BLD AUTO: 0.57 K/UL
MONOCYTES NFR BLD AUTO: 7.9 %
NEUTROPHILS # BLD AUTO: 3.65 K/UL
NEUTROPHILS NFR BLD AUTO: 50.3 %
NITRITE URINE: NEGATIVE
NONHDLC SERPL-MCNC: 184 MG/DL
PH URINE: 5.5
PLATELET # BLD AUTO: 226 K/UL
POTASSIUM SERPL-SCNC: 4 MMOL/L
PROT SERPL-MCNC: 7.8 G/DL
PROTEIN URINE: NORMAL MG/DL
PSA SERPL-MCNC: 0.85 NG/ML
RBC # BLD: 5.37 M/UL
RBC # FLD: 13.3 %
RED BLOOD CELLS URINE: 2 /HPF
SODIUM SERPL-SCNC: 137 MMOL/L
SPECIFIC GRAVITY URINE: >1.03
T4 FREE SERPL-MCNC: 1.3 NG/DL
TIBC SERPL-MCNC: 297 UG/DL
TRIGL SERPL-MCNC: 477 MG/DL
TSH SERPL-ACNC: 2 UIU/ML
UIBC SERPL-MCNC: 136 UG/DL
URATE SERPL-MCNC: 4.8 MG/DL
UROBILINOGEN URINE: 0.2 MG/DL
VIT B12 SERPL-MCNC: 764 PG/ML
WBC # FLD AUTO: 7.25 K/UL
WHITE BLOOD CELLS URINE: 0 /HPF

## 2025-08-19 RX ORDER — ERGOCALCIFEROL 1.25 MG/1
1.25 MG CAPSULE, LIQUID FILLED ORAL
Qty: 8 | Refills: 0 | Status: ACTIVE | COMMUNITY
Start: 2025-08-19 | End: 1900-01-01

## 2025-08-20 PROBLEM — Z01.818 PREOP EXAM FOR INTERNAL MEDICINE: Status: ACTIVE | Noted: 2025-08-18

## 2025-08-20 LAB — BACTERIA UR CULT: NORMAL

## 2025-08-28 ENCOUNTER — TRANSCRIPTION ENCOUNTER (OUTPATIENT)
Age: 45
End: 2025-08-28

## 2025-09-15 ENCOUNTER — TRANSCRIPTION ENCOUNTER (OUTPATIENT)
Age: 45
End: 2025-09-15